# Patient Record
Sex: MALE | Race: OTHER | NOT HISPANIC OR LATINO | Employment: FULL TIME | URBAN - METROPOLITAN AREA
[De-identification: names, ages, dates, MRNs, and addresses within clinical notes are randomized per-mention and may not be internally consistent; named-entity substitution may affect disease eponyms.]

---

## 2019-07-28 RX ORDER — CITALOPRAM 20 MG/1
TABLET ORAL
Qty: 30 TABLET | Refills: 5 | OUTPATIENT
Start: 2019-07-28

## 2019-08-08 RX ORDER — CITALOPRAM 20 MG/1
10 TABLET ORAL DAILY
Refills: 5 | COMMUNITY
Start: 2019-06-29 | End: 2021-06-01 | Stop reason: SDUPTHER

## 2019-08-09 ENCOUNTER — OFFICE VISIT (OUTPATIENT)
Dept: FAMILY MEDICINE CLINIC | Facility: CLINIC | Age: 59
End: 2019-08-09
Payer: COMMERCIAL

## 2019-08-09 VITALS
RESPIRATION RATE: 18 BRPM | HEART RATE: 61 BPM | HEIGHT: 72 IN | WEIGHT: 228.2 LBS | TEMPERATURE: 97.4 F | OXYGEN SATURATION: 99 % | DIASTOLIC BLOOD PRESSURE: 76 MMHG | BODY MASS INDEX: 30.91 KG/M2 | SYSTOLIC BLOOD PRESSURE: 142 MMHG

## 2019-08-09 DIAGNOSIS — E78.5 HYPERLIPIDEMIA, UNSPECIFIED HYPERLIPIDEMIA TYPE: ICD-10-CM

## 2019-08-09 DIAGNOSIS — F41.9 ANXIETY: Primary | ICD-10-CM

## 2019-08-09 DIAGNOSIS — L40.9 PSORIASIS: ICD-10-CM

## 2019-08-09 DIAGNOSIS — R35.1 NOCTURIA: ICD-10-CM

## 2019-08-09 PROCEDURE — 99203 OFFICE O/P NEW LOW 30 MIN: CPT | Performed by: FAMILY MEDICINE

## 2019-08-09 PROCEDURE — 3008F BODY MASS INDEX DOCD: CPT | Performed by: FAMILY MEDICINE

## 2019-08-09 PROCEDURE — 1036F TOBACCO NON-USER: CPT | Performed by: FAMILY MEDICINE

## 2019-08-09 RX ORDER — CITALOPRAM 20 MG/1
20 TABLET ORAL DAILY
Qty: 90 TABLET | Refills: 3 | Status: SHIPPED | OUTPATIENT
Start: 2019-08-09 | End: 2020-08-18 | Stop reason: SDUPTHER

## 2019-08-09 NOTE — PROGRESS NOTES
FAMILY PRACTICE OFFICE VISIT       NAME: Sybil Geiger  AGE: 63 y o  SEX: male       : 1960        MRN: [id number]    DATE: 2019  TIME: 1:11 PM    Assessment and Plan     Problem List Items Addressed This Visit        Musculoskeletal and Integument    Psoriasis     Psoriasis  Patient given a prescription for Ultravate cream to apply twice daily to affected area for maximum of 2 weeks at a time  Patient will call if he continues with symptoms despite use of medication         Relevant Medications    halobetasol (ULTRAVATE) 0 05 % cream    Other Relevant Orders    TSH, 3rd generation    Comprehensive metabolic panel    CBC       Other    Anxiety - Primary     Anxiety  Patient was given a refill on his medication which she feels works well for his generalized anxiety  Relevant Medications    citalopram (CeleXA) 20 mg tablet      Other Visit Diagnoses     Nocturia        Relevant Orders    PSA, Total Screen    Hyperlipidemia, unspecified hyperlipidemia type        Relevant Orders    Lipid panel              Chief Complaint     Chief Complaint   Patient presents with    new patient       History of Present Illness     This is the 1st office visit for patient who used to see Tom Remy  He he would only see Dr  Every few years and have rare periodic blood work  He has been on Celexa for the past 20 years with good success in treating his anxiety  We discussed screening colonoscopy and blood work that is recommended in his age group  Patient also has a long history of psoriasis that was diagnosed by a Dr Jose Hernández  He had been using an over-the-counter preparation that was not effective but recommended by dermatologist   Patient requested alternative treatment for his intermittent flare-ups of psoriasis which is primarily on his hands only      Review of Systems   Review of Systems   Constitutional: Negative  HENT: Negative  Respiratory: Negative  Cardiovascular: Negative  Gastrointestinal: Negative  Genitourinary: Negative  Skin:        As per HPI   Neurological: Negative  Psychiatric/Behavioral:        As per HPI       Active Problem List     Patient Active Problem List   Diagnosis    Anxiety    Psoriasis       Past Medical History:  No past medical history on file  Past Surgical History:  No past surgical history on file  Family History:  No family history on file      Social History:  Social History     Socioeconomic History    Marital status: Unknown     Spouse name: Not on file    Number of children: Not on file    Years of education: Not on file    Highest education level: Not on file   Occupational History    Not on file   Social Needs    Financial resource strain: Not on file    Food insecurity:     Worry: Not on file     Inability: Not on file    Transportation needs:     Medical: Not on file     Non-medical: Not on file   Tobacco Use    Smoking status: Never Smoker    Smokeless tobacco: Never Used   Substance and Sexual Activity    Alcohol use: Yes     Frequency: Monthly or less     Drinks per session: 1 or 2    Drug use: Not on file    Sexual activity: Not on file   Lifestyle    Physical activity:     Days per week: Not on file     Minutes per session: Not on file    Stress: Not on file   Relationships    Social connections:     Talks on phone: Not on file     Gets together: Not on file     Attends [employer]ars[employer] service: Not on file     Active member of club or organization: Not on file     Attends meetings of clubs or organizations: Not on file     Relationship status: Not on file    Intimate partner violence:     Fear of current or ex partner: Not on file     Emotionally abused: Not on file     Physically abused: Not on file     Forced sexual activity: Not on file   Other Topics Concern    Not on file   Social History Narrative    Not on file       Objective     Vitals:    08/09/19 1147   BP: 142/76   Pulse: 61   Resp: 18   Temp: (!) 97 4 °F (36 3 °C)   SpO2: 99%     Wt Readings from Last 3 Encounters:   08/09/19 104 kg (228 lb 3 2 oz)       Physical Exam   Constitutional: He is oriented to person, place, and time  No distress  HENT:   Right Ear: External ear normal    Left Ear: External ear normal    Mouth/Throat: Oropharynx is clear and moist  No oropharyngeal exudate  Tympanic membranes within normal limits bilaterally   Cardiovascular: Normal heart sounds  Regular rate and rhythm with no murmurs   Pulmonary/Chest: Breath sounds normal    Lungs are clear to auscultation without wheezes,rales, or rhonchi   Musculoskeletal: He exhibits no edema  Lymphadenopathy:     He has no cervical adenopathy  Neurological: He is alert and oriented to person, place, and time  No cranial nerve deficit  Skin:   Patient with dry cracked skin along fingers of bilateral hands  No sinus infection  Psychiatric: He has a normal mood and affect  His behavior is normal  Judgment and thought content normal        Pertinent Laboratory/Diagnostic Studies:  No results found for: GLUCOSE, BUN, CREATININE, CALCIUM, NA, K, CO2, CL  No results found for: ALT, AST, GGT, ALKPHOS, BILITOT    No results found for: WBC, HGB, HCT, MCV, PLT    No results found for: TSH    No results found for: CHOL  No results found for: TRIG  No results found for: HDL  No results found for: LDLCALC  No results found for: HGBA1C    No results found for this or any previous visit      Orders Placed This Encounter   Procedures    Lipid panel    PSA, Total Screen    TSH, 3rd generation    Comprehensive metabolic panel    CBC       ALLERGIES:  No Known Allergies    Current Medications     Current Outpatient Medications   Medication Sig Dispense Refill    citalopram (CeleXA) 20 mg tablet Take 20 mg by mouth daily  5    citalopram (CeleXA) 20 mg tablet Take 1 tablet (20 mg total) by mouth daily 90 tablet 3    halobetasol (ULTRAVATE) 0 05 % cream Apply topically 2 (two) times a day 50 g 3 No current facility-administered medications for this visit  [facility] [place]     Health Maintenance   Topic Date Due    Hepatitis C Screening  1960    CRC Screening: Colonoscopy  1960    BMI: Followup Plan  08/16/1979    DTaP,Tdap,and Td Vaccines (1 - Tdap) 08/16/1982    INFLUENZA VACCINE  07/01/2019    Depression Screening PHQ  08/09/2020    BMI: Adult  08/09/2020    Pneumococcal Vaccine: 65+ Years (1 of 2 - PCV13) 08/16/2026    Pneumococcal Vaccine: Pediatrics (0 to 5 Years) and At-Risk Patients (6 to 59 Years)  Aged Out    HEPATITIS B VACCINES  Aged Out       There is no immunization history on file for this patient      Brisa Coleman MD

## 2019-08-09 NOTE — ASSESSMENT & PLAN NOTE
Anxiety  Patient was given a refill on his medication which she feels works well for his generalized anxiety

## 2019-08-09 NOTE — ASSESSMENT & PLAN NOTE
Psoriasis  Patient given a prescription for Ultravate cream to apply twice daily to affected area for maximum of 2 weeks at a time    Patient will call if he continues with symptoms despite use of medication

## 2019-09-12 ENCOUNTER — TELEPHONE (OUTPATIENT)
Dept: FAMILY MEDICINE CLINIC | Facility: CLINIC | Age: 59
End: 2019-09-12

## 2019-09-12 DIAGNOSIS — Z12.11 COLON CANCER SCREENING: Primary | ICD-10-CM

## 2019-09-12 NOTE — TELEPHONE ENCOUNTER
Patient is interested in the Cologard if you would put order in the system and have [facility]  Advise patient at [phone] when done

## 2019-11-05 ENCOUNTER — APPOINTMENT (OUTPATIENT)
Dept: LAB | Facility: CLINIC | Age: 59
End: 2019-11-05
Payer: COMMERCIAL

## 2019-11-05 DIAGNOSIS — E78.5 HYPERLIPIDEMIA, UNSPECIFIED HYPERLIPIDEMIA TYPE: ICD-10-CM

## 2019-11-05 DIAGNOSIS — L40.9 PSORIASIS: ICD-10-CM

## 2019-11-05 DIAGNOSIS — R35.1 NOCTURIA: ICD-10-CM

## 2019-11-05 LAB
ALBUMIN SERPL BCP-MCNC: 4.2 G/DL (ref 3.5–5)
ALP SERPL-CCNC: 56 U/L (ref 46–116)
ALT SERPL W P-5'-P-CCNC: 36 U/L (ref 12–78)
ANION GAP SERPL CALCULATED.3IONS-SCNC: 7 MMOL/L (ref 4–13)
AST SERPL W P-5'-P-CCNC: 21 U/L (ref 5–45)
BILIRUB SERPL-MCNC: 0.88 MG/DL (ref 0.2–1)
BUN SERPL-MCNC: 18 MG/DL (ref 5–25)
CALCIUM SERPL-MCNC: 9.2 MG/DL (ref 8.3–10.1)
CHLORIDE SERPL-SCNC: 106 MMOL/L (ref 100–108)
CHOLEST SERPL-MCNC: 190 MG/DL (ref 50–200)
CO2 SERPL-SCNC: 27 MMOL/L (ref 21–32)
CREAT SERPL-MCNC: 0.87 MG/DL (ref 0.6–1.3)
ERYTHROCYTE [DISTWIDTH] IN BLOOD BY AUTOMATED COUNT: 12.4 % (ref 11.6–15.1)
GFR SERPL CREATININE-BSD FRML MDRD: 95 ML/MIN/1.73SQ M
GLUCOSE P FAST SERPL-MCNC: 103 MG/DL (ref 65–99)
HCT VFR BLD AUTO: 44.3 % (ref 36.5–49.3)
HDLC SERPL-MCNC: 51 MG/DL
HGB BLD-MCNC: 14.8 G/DL (ref 12–17)
LDLC SERPL CALC-MCNC: 124 MG/DL (ref 0–100)
MCH RBC QN AUTO: 30.3 PG (ref 26.8–34.3)
MCHC RBC AUTO-ENTMCNC: 33.4 G/DL (ref 31.4–37.4)
MCV RBC AUTO: 91 FL (ref 82–98)
NONHDLC SERPL-MCNC: 139 MG/DL
PLATELET # BLD AUTO: 232 THOUSANDS/UL (ref 149–390)
PMV BLD AUTO: 10.4 FL (ref 8.9–12.7)
POTASSIUM SERPL-SCNC: 3.9 MMOL/L (ref 3.5–5.3)
PROT SERPL-MCNC: 7.2 G/DL (ref 6.4–8.2)
PSA SERPL-MCNC: 0.4 NG/ML (ref 0–4)
RBC # BLD AUTO: 4.88 MILLION/UL (ref 3.88–5.62)
SODIUM SERPL-SCNC: 140 MMOL/L (ref 136–145)
TRIGL SERPL-MCNC: 77 MG/DL
TSH SERPL DL<=0.05 MIU/L-ACNC: 1.06 UIU/ML (ref 0.36–3.74)
WBC # BLD AUTO: 6.15 THOUSAND/UL (ref 4.31–10.16)

## 2019-11-05 PROCEDURE — 85027 COMPLETE CBC AUTOMATED: CPT

## 2019-11-05 PROCEDURE — 80061 LIPID PANEL: CPT

## 2019-11-05 PROCEDURE — G0103 PSA SCREENING: HCPCS

## 2019-11-05 PROCEDURE — 84443 ASSAY THYROID STIM HORMONE: CPT

## 2019-11-05 PROCEDURE — 36415 COLL VENOUS BLD VENIPUNCTURE: CPT

## 2019-11-05 PROCEDURE — 80053 COMPREHEN METABOLIC PANEL: CPT

## 2019-11-06 ENCOUNTER — TELEPHONE (OUTPATIENT)
Dept: FAMILY MEDICINE CLINIC | Facility: CLINIC | Age: 59
End: 2019-11-06

## 2019-11-06 NOTE — TELEPHONE ENCOUNTER
----- Message from Emiliano Rosario MD sent at 40/1/8375  8:01 AM EST -----  All recent blood work stable for patient

## 2020-08-18 ENCOUNTER — OFFICE VISIT (OUTPATIENT)
Dept: FAMILY MEDICINE CLINIC | Facility: CLINIC | Age: 60
End: 2020-08-18
Payer: COMMERCIAL

## 2020-08-18 VITALS
RESPIRATION RATE: 15 BRPM | SYSTOLIC BLOOD PRESSURE: 120 MMHG | HEIGHT: 73 IN | BODY MASS INDEX: 30.38 KG/M2 | OXYGEN SATURATION: 97 % | DIASTOLIC BLOOD PRESSURE: 70 MMHG | HEART RATE: 71 BPM | WEIGHT: 229.2 LBS | TEMPERATURE: 97.6 F

## 2020-08-18 DIAGNOSIS — L40.9 PSORIASIS: ICD-10-CM

## 2020-08-18 DIAGNOSIS — T14.8XXA MUSCLE STRAIN: Primary | ICD-10-CM

## 2020-08-18 PROCEDURE — 3008F BODY MASS INDEX DOCD: CPT | Performed by: FAMILY MEDICINE

## 2020-08-18 PROCEDURE — 3725F SCREEN DEPRESSION PERFORMED: CPT | Performed by: FAMILY MEDICINE

## 2020-08-18 PROCEDURE — 99213 OFFICE O/P EST LOW 20 MIN: CPT | Performed by: FAMILY MEDICINE

## 2020-08-18 PROCEDURE — 1036F TOBACCO NON-USER: CPT | Performed by: FAMILY MEDICINE

## 2020-08-18 RX ORDER — PREDNISONE 20 MG/1
TABLET ORAL
Qty: 11 TABLET | Refills: 0 | Status: SHIPPED | OUTPATIENT
Start: 2020-08-18

## 2020-08-18 RX ORDER — LOTEPREDNOL ETABONATE 5 MG/G
OINTMENT OPHTHALMIC
COMMUNITY
Start: 2020-07-08

## 2020-08-18 RX ORDER — AZELAIC ACID 0.15 G/G
GEL TOPICAL
COMMUNITY
Start: 2020-08-12

## 2020-08-18 RX ORDER — MINOCYCLINE HYDROCHLORIDE 100 MG/1
CAPSULE ORAL
COMMUNITY
Start: 2020-08-17

## 2020-08-18 NOTE — ASSESSMENT & PLAN NOTE
Muscle strain  I suspect patient has left gastrocnemius strain  He was given prescription for prednisone tapering dose consisting of 40 mg x 3 days, 20 mg x 3 days, 10 mg x 4 days  He may also applies ice to the affected area for 10 minutes on a daily basis  Patient will call if symptoms persist without change after medication completed

## 2020-08-18 NOTE — PROGRESS NOTES
FAMILY PRACTICE OFFICE VISIT       NAME: Huong Frye  AGE: 62 y o  SEX: male       : 1960        MRN: [id number]    DATE: 2020  TIME: 1:44 PM    Assessment and Plan     Problem List Items Addressed This Visit        Musculoskeletal and Integument    Psoriasis    Relevant Medications    Azelaic Acid 15 % cream    predniSONE 20 mg tablet    halobetasol (ULTRAVATE) 0 05 % cream    Muscle strain - Primary     Muscle strain  I suspect patient has left gastrocnemius strain  He was given prescription for prednisone tapering dose consisting of 40 mg x 3 days, 20 mg x 3 days, 10 mg x 4 days  He may also applies ice to the affected area for 10 minutes on a daily basis  Patient will call if symptoms persist without change after medication completed  Chief Complaint     Chief Complaint   Patient presents with    Annual Exam    calf pain     left x 1 week        History of Present Illness     Patient states 5-10 years ago he had back pain for suspected herniated disc  He had physical therapy and medications and chiropractic treatments  At that time he did have lumbar spine discomfort with left calf pain  Currently he states he has an ache in his left calf the area that is only symptomatic with extended periods of standing or pushing off on his toes  He has no discomfort while sitting  He denies any chest pain or shortness of breath  He states approximately 10 days ago he was very busy in climbing in and out of a truck but denies any discomfort due during that day at work  Review of Systems   Review of Systems   Constitutional: Negative  Respiratory: Negative  Cardiovascular: Negative  Musculoskeletal: Positive for myalgias  Active Problem List     Patient Active Problem List   Diagnosis    Anxiety    Psoriasis    Muscle strain       Past Medical History:  No past medical history on file  Past Surgical History:  No past surgical history on file      Family History:  No family history on file  Social History:  Social History     Socioeconomic History    Marital status: Unknown     Spouse name: Not on file    Number of children: Not on file    Years of education: Not on file    Highest education level: Not on file   Occupational History    Not on file   Social Needs    Financial resource strain: Not on file    Food insecurity     Worry: Not on file     Inability: Not on file    Transportation needs     Medical: Not on file     Non-medical: Not on file   Tobacco Use    Smoking status: Never Smoker    Smokeless tobacco: Never Used   Substance and Sexual Activity    Alcohol use: Yes     Frequency: Monthly or less     Drinks per session: 1 or 2     Comment: occasional    Drug use: Never    Sexual activity: Not on file   Lifestyle    Physical activity     Days per week: Not on file     Minutes per session: Not on file    Stress: Not on file   Relationships    Social connections     Talks on phone: Not on file     Gets together: Not on file     Attends [employer] service: Not on file     Active member of club or organization: Not on file     Attends meetings of clubs or organizations: Not on file     Relationship status: Not on file    Intimate partner violence     Fear of current or ex partner: Not on file     Emotionally abused: Not on file     Physically abused: Not on file     Forced sexual activity: Not on file   Other Topics Concern    Not on file   Social History Narrative    Not on file       Objective     Vitals:    08/18/20 1307   BP: 120/70   Pulse: 71   Resp: 15   Temp: 97 6 °F (36 4 °C)   SpO2: 97%     Wt Readings from Last 3 Encounters:   08/18/20 104 kg (229 lb 3 2 oz)   08/09/19 104 kg (228 lb 3 2 oz)       Physical Exam  Constitutional:       Appearance: Normal appearance  Musculoskeletal:         General: No swelling  Right lower leg: No edema  Left lower leg: No edema        Comments: Patient's left calf with negative Homans sign   Negative ecchymosis  Muscle strength 5/5  Normal range of motion  Negative straight leg raising  Increased tenderness with standing on tiptoes  Neurological:      Mental Status: He is alert           Pertinent Laboratory/Diagnostic Studies:  Lab Results   Component Value Date    BUN 18 11/05/2019    CREATININE 0 87 11/05/2019    CALCIUM 9 2 11/05/2019    K 3 9 11/05/2019    CO2 27 11/05/2019     11/05/2019     Lab Results   Component Value Date    ALT 36 11/05/2019    AST 21 11/05/2019    ALKPHOS 56 11/05/2019       Lab Results   Component Value Date    WBC 6 15 11/05/2019    HGB 14 8 11/05/2019    HCT 44 3 11/05/2019    MCV 91 11/05/2019     11/05/2019       No results found for: TSH    No results found for: CHOL  Lab Results   Component Value Date    TRIG 77 11/05/2019     Lab Results   Component Value Date    HDL 51 11/05/2019     Lab Results   Component Value Date    LDLCALC 124 (H) 11/05/2019     No results found for: HGBA1C    Results for orders placed or performed in visit on 11/05/19   Lipid panel   Result Value Ref Range    Cholesterol 190 50 - 200 mg/dL    Triglycerides 77 <=150 mg/dL    HDL, Direct 51 >=40 mg/dL    LDL Calculated 124 (H) 0 - 100 mg/dL    Non-HDL-Chol (CHOL-HDL) 139 mg/dl   PSA, Total Screen   Result Value Ref Range    PSA 0 4 0 0 - 4 0 ng/mL   TSH, 3rd generation   Result Value Ref Range    TSH 3RD GENERATON 1 060 0 358 - 3 740 uIU/mL   Comprehensive metabolic panel   Result Value Ref Range    Sodium 140 136 - 145 mmol/L    Potassium 3 9 3 5 - 5 3 mmol/L    Chloride 106 100 - 108 mmol/L    CO2 27 21 - 32 mmol/L    ANION GAP 7 4 - 13 mmol/L    BUN 18 5 - 25 mg/dL    Creatinine 0 87 0 60 - 1 30 mg/dL    Glucose, Fasting 103 (H) 65 - 99 mg/dL    Calcium 9 2 8 3 - 10 1 mg/dL    AST 21 5 - 45 U/L    ALT 36 12 - 78 U/L    Alkaline Phosphatase 56 46 - 116 U/L    Total Protein 7 2 6 4 - 8 2 g/dL    Albumin 4 2 3 5 - 5 0 g/dL    Total Bilirubin 0 88 0 20 - 1 00 mg/dL eGFR 95 ml/min/1 73sq m   CBC   Result Value Ref Range    WBC 6 15 4 31 - 10 16 Thousand/uL    RBC 4 88 3 88 - 5 62 Million/uL    Hemoglobin 14 8 12 0 - 17 0 g/dL    Hematocrit 44 3 36 5 - 49 3 %    MCV 91 82 - 98 fL    MCH 30 3 26 8 - 34 3 pg    MCHC 33 4 31 4 - 37 4 g/dL    RDW 12 4 11 6 - 15 1 %    Platelets 561 767 - 140 Thousands/uL    MPV 10 4 8 9 - 12 7 fL       No orders of the defined types were placed in this encounter  ALLERGIES:  No Known Allergies    Current Medications     Current Outpatient Medications   Medication Sig Dispense Refill    Azelaic Acid 15 % cream       citalopram (CeleXA) 20 mg tablet Take 10 mg by mouth daily   5    Lotemax 0 5 % OINT INSTILL 1/2 [facility] INTO THE RIGHT EYE      minocycline (MINOCIN) 100 mg capsule       halobetasol (ULTRAVATE) 0 05 % cream Apply topically 2 (two) times a day 50 g 3    predniSONE 20 mg tablet 2 tabs X 3 days, 1 tab X 3 days, 1/2 tab X 4 days 11 tablet 0     No current facility-administered medications for this visit  Health Maintenance     Health Maintenance   Topic Date Due    Hepatitis C Screening  1960    HIV Screening  08/16/1976    BMI: Followup Plan  08/16/1979    Annual Physical  08/16/1979    DTaP,Tdap,and Td Vaccines (1 - Tdap) 08/16/1982    Influenza Vaccine  07/01/2020    Depression Screening PHQ  08/18/2021    BMI: Adult  08/18/2021    Colorectal Cancer Screening  11/09/2022    Pneumococcal Vaccine: Pediatrics (0 to 5 Years) and At-Risk Patients (6 to 59 Years)  Aged Out    HIB Vaccine  Aged Out    Hepatitis B Vaccine  Aged Out    IPV Vaccine  Aged Out    Hepatitis A Vaccine  Aged Out    Meningococcal ACWY Vaccine  Aged Out    HPV Vaccine  Aged Out       There is no immunization history on file for this patient      Raeann Guzman MD

## 2020-08-20 ENCOUNTER — EVALUATION (OUTPATIENT)
Dept: PHYSICAL THERAPY | Facility: REHABILITATION | Age: 60
End: 2020-08-20
Payer: COMMERCIAL

## 2020-08-20 ENCOUNTER — TELEPHONE (OUTPATIENT)
Dept: FAMILY MEDICINE CLINIC | Facility: CLINIC | Age: 60
End: 2020-08-20

## 2020-08-20 DIAGNOSIS — M54.16 ACUTE LEFT LUMBAR RADICULOPATHY: Primary | ICD-10-CM

## 2020-08-20 DIAGNOSIS — T14.8XXA MUSCLE STRAIN: ICD-10-CM

## 2020-08-20 DIAGNOSIS — T14.8XXA MUSCLE STRAIN: Primary | ICD-10-CM

## 2020-08-20 PROCEDURE — 97162 PT EVAL MOD COMPLEX 30 MIN: CPT | Performed by: PHYSICAL THERAPIST

## 2020-08-20 PROCEDURE — 97110 THERAPEUTIC EXERCISES: CPT | Performed by: PHYSICAL THERAPIST

## 2020-08-20 NOTE — TELEPHONE ENCOUNTER
If patient is in that much discomfort he would need to be seen in the emergency room to rule out the possibility of a blood clot    I recommend that he go to [facility] or [facility] for evaluation

## 2020-08-20 NOTE — TELEPHONE ENCOUNTER
Left detailed message for patient on his voice mail to be seen at either the [facility] or [address] [place] [address] of Queenie Phalen  I also asked him to please call us back and update us on his symptoms and how he is doing

## 2020-08-20 NOTE — TELEPHONE ENCOUNTER
He has only been on med for a couple days so too early to see if it would help  I would recommend starting physical therapy as we discussed  He can make appt with [facility] in [place] at [phone]  I can write note for work but cannot have it open ended ( until pain gets better)   We could try 2 weeks off and if persists he may need f/u ov
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
## 2020-08-20 NOTE — TELEPHONE ENCOUNTER
Patient called stating he is still having an extreme amount of pain, prednisone has not helped at all, he can barely walk, he is requesting a letter to be off work until pain is maintained  He would also like another medication prescribed  Thank you!

## 2020-08-20 NOTE — TELEPHONE ENCOUNTER
Patient was evaluated by PT today, they believe it is nerve pain, he is going to  continue to work with them and see if it gets better

## 2020-08-20 NOTE — TELEPHONE ENCOUNTER
Will write letter for patent for the two weeks and gave him information for PT  Patient statess he does not thing that PT will help and he is in so much pain due to not being able to even walk in to the kitchen  He had to be sent home from work today since he could not stand and was in way too much pain  Would you be willing to prescribe a pain medication?

## 2020-08-24 NOTE — PROGRESS NOTES
Daily Note     Today's date: 2020  Patient name: Juan Potter  : 1960  MRN: [id number]  Referring provider: Marquis Nomi MD  Dx:   Encounter Diagnosis     ICD-10-CM    1  Muscle strain  T14  8XXA    2  Acute left lumbar radiculopathy  M54 16        Start Time: 0940  Stop Time: 1020  Total time in clinic (min): 40 minutes    Subjective: Pt states he is feeling a lot better  No pain today, just some numbness in the L foot  Objective: See treatment diary below  Lumbar flex and ext AROM: full and pain free  Reflexes   Left   Patellar (L4): brisk (3+)  Achilles (S1): brisk (3+)     Right   Patellar (L4): normal (2+)  Achilles (S1): normal (2+)    Myotomes testing  DF = 5/5 RLE  DF = 3-/5 LLE  Great toe ext RLE = 5/5  Great toe ext LLE = 3-/5    SLR and slump test  R: neg  L: neg    Light touch BLE intact     Assessment: Pt presents with significant improvement in pain and sx's with only reports of L toe tingling and numbness  He continues to present with LLE hyper reflexia and DF/great toe ext weakness  Pt responded well to core strengthening exercises  Updated HEP - prone press ups 2x/day and TrA iso  Plan: Continue per plan of care  Reassess LE MMT and reflexes next visit  Progress core stabilization and return to workout/work       Precautions: standard    FOTO   = 45/71    Manuals            LLE LAD DS            BLE LAD DS                         Assess  5' DS           Neuro Re-Ed             TrA with paloff press  5"2x10 each purple           Supine TrA with dying bug LE only  2x10 each           Bosu bridges  5"x20           TrA iso  Pt edu                                                  Ther Ex             Double knee to chest stretch 5"x20            TrA iso 5"x15            Prone on elbows  90" no change HEP          Prone press up  5"x15 sx's dec           Slant board  2'                                                  Ther Activity             Pt edu 5' Gait Training                                       Modalities

## 2020-08-25 ENCOUNTER — OFFICE VISIT (OUTPATIENT)
Dept: PHYSICAL THERAPY | Facility: REHABILITATION | Age: 60
End: 2020-08-25
Payer: COMMERCIAL

## 2020-08-25 DIAGNOSIS — T14.8XXA MUSCLE STRAIN: Primary | ICD-10-CM

## 2020-08-25 DIAGNOSIS — M54.16 ACUTE LEFT LUMBAR RADICULOPATHY: ICD-10-CM

## 2020-08-25 PROCEDURE — 97140 MANUAL THERAPY 1/> REGIONS: CPT | Performed by: PHYSICAL THERAPIST

## 2020-08-25 PROCEDURE — 97112 NEUROMUSCULAR REEDUCATION: CPT | Performed by: PHYSICAL THERAPIST

## 2020-08-25 PROCEDURE — 97110 THERAPEUTIC EXERCISES: CPT | Performed by: PHYSICAL THERAPIST

## 2020-08-27 ENCOUNTER — APPOINTMENT (OUTPATIENT)
Dept: PHYSICAL THERAPY | Facility: REHABILITATION | Age: 60
End: 2020-08-27
Payer: COMMERCIAL

## 2020-09-30 ENCOUNTER — TELEPHONE (OUTPATIENT)
Dept: FAMILY MEDICINE CLINIC | Facility: CLINIC | Age: 60
End: 2020-09-30

## 2020-09-30 DIAGNOSIS — T14.8XXA MUSCLE STRAIN: Primary | ICD-10-CM

## 2020-09-30 RX ORDER — PREDNISONE 20 MG/1
TABLET ORAL
Qty: 10 TABLET | Refills: 0 | Status: SHIPPED | OUTPATIENT
Start: 2020-09-30 | End: 2021-09-07 | Stop reason: SDUPTHER

## 2020-09-30 NOTE — TELEPHONE ENCOUNTER
Patient was seen in August for a muscle strain and he has strained the same muscle again, would like to know if the doctor would be willing to prescribe prednisone for 7 days again without an appt because he is leaving on vacation and would like to nip the pain before it becomes as bad as it was last time  Please advise

## 2021-06-01 DIAGNOSIS — F41.9 ANXIETY: Primary | ICD-10-CM

## 2021-06-01 RX ORDER — CITALOPRAM 20 MG/1
10 TABLET ORAL DAILY
Qty: 30 TABLET | Refills: 0 | Status: SHIPPED | OUTPATIENT
Start: 2021-06-01 | End: 2021-07-02

## 2021-06-01 NOTE — TELEPHONE ENCOUNTER
Pt pharmacy requesting refill    Requested Prescriptions     Pending Prescriptions Disp Refills    citalopram (CeleXA) 20 mg tablet  5     Sig: Take 0 5 tablets (10 mg total) by mouth daily

## 2021-07-31 DIAGNOSIS — F41.9 ANXIETY: ICD-10-CM

## 2021-08-03 RX ORDER — CITALOPRAM 20 MG/1
TABLET ORAL
Qty: 15 TABLET | Refills: 0 | Status: SHIPPED | OUTPATIENT
Start: 2021-08-03 | End: 2021-08-30

## 2021-08-28 DIAGNOSIS — F41.9 ANXIETY: ICD-10-CM

## 2021-08-30 RX ORDER — CITALOPRAM 20 MG/1
TABLET ORAL
Qty: 15 TABLET | Refills: 0 | Status: SHIPPED | OUTPATIENT
Start: 2021-08-30 | End: 2021-09-30

## 2021-08-30 NOTE — TELEPHONE ENCOUNTER
I will provide 30 day supply for patient however he needs office visit prior to authorizing refills on this medication

## 2021-09-07 ENCOUNTER — OFFICE VISIT (OUTPATIENT)
Dept: FAMILY MEDICINE CLINIC | Facility: CLINIC | Age: 61
End: 2021-09-07
Payer: COMMERCIAL

## 2021-09-07 VITALS
BODY MASS INDEX: 31.14 KG/M2 | SYSTOLIC BLOOD PRESSURE: 124 MMHG | DIASTOLIC BLOOD PRESSURE: 68 MMHG | TEMPERATURE: 97.8 F | WEIGHT: 217.5 LBS | OXYGEN SATURATION: 97 % | RESPIRATION RATE: 17 BRPM | HEART RATE: 58 BPM | HEIGHT: 70 IN

## 2021-09-07 DIAGNOSIS — R20.2 PARESTHESIA: Primary | ICD-10-CM

## 2021-09-07 DIAGNOSIS — F41.9 ANXIETY: ICD-10-CM

## 2021-09-07 DIAGNOSIS — T14.8XXA MUSCLE STRAIN: ICD-10-CM

## 2021-09-07 PROCEDURE — 3008F BODY MASS INDEX DOCD: CPT | Performed by: FAMILY MEDICINE

## 2021-09-07 PROCEDURE — 1036F TOBACCO NON-USER: CPT | Performed by: FAMILY MEDICINE

## 2021-09-07 PROCEDURE — 99214 OFFICE O/P EST MOD 30 MIN: CPT | Performed by: FAMILY MEDICINE

## 2021-09-07 PROCEDURE — 3725F SCREEN DEPRESSION PERFORMED: CPT | Performed by: FAMILY MEDICINE

## 2021-09-07 RX ORDER — PREDNISONE 20 MG/1
TABLET ORAL
Qty: 10 TABLET | Refills: 0 | Status: SHIPPED | OUTPATIENT
Start: 2021-09-07

## 2021-09-07 NOTE — ASSESSMENT & PLAN NOTE
Anxiety  Patient with symptoms related to anxiety and OCD  The symptoms are exacerbated by his work duties  Patient requested a leave of absence to see if the symptoms improve as well as his chronic intermittent leg pains  He was given prescription to be excused from work until follow-up appointment in mid October 2021

## 2021-09-07 NOTE — ASSESSMENT & PLAN NOTE
Paresthesia  Patient was given a refill on prednisone tapering dose to take as directed  If symptoms persist without improvement he was given prescription to obtain EMG testing of lower extremities for further evaluation

## 2021-09-07 NOTE — PROGRESS NOTES
FAMILY PRACTICE OFFICE VISIT       NAME: Raiza Bajwa  AGE: 62 y o  SEX: male       : 1960        MRN: [id number]    DATE: 2021  TIME: 1:37 PM    Assessment and Plan     Problem List Items Addressed This Visit        Musculoskeletal and Integument    Muscle strain    Relevant Medications    predniSONE 20 mg tablet       Other    Anxiety      Anxiety  Patient with symptoms related to anxiety and OCD  The symptoms are exacerbated by his work duties  Patient requested a leave of absence to see if the symptoms improve as well as his chronic intermittent leg pains  He was given prescription to be excused from work until follow-up appointment in mid 2021  Paresthesia - Primary       Paresthesia  Patient was given a refill on prednisone tapering dose to take as directed  If symptoms persist without improvement he was given prescription to obtain EMG testing of lower extremities for further evaluation  Relevant Orders    EMG 2 limb lower extremity          BMI Counseling: Body mass index is 31 14 kg/m²  The BMI is above normal  Nutrition recommendations include decreasing portion sizes and moderation in carbohydrate intake  Exercise recommendations include exercising 3-5 times per week  No pharmacotherapy was ordered  Patient referred to PCP due to patient being overweight  Chief Complaint     Chief Complaint   Patient presents with    Anxiety    Leg Pain    Well Check       History of Present Illness       Patient in the office with complaints of chronic intermittent bilateral lower extremity discomfort  Patient has a history arm of herniated disc in his lumbar spine  Last year he did take some prednisone which seemed to help his situation however due to the nature was 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  He does have some numbness of his toes on his left foot        Patient also describes chronic his symptoms of anxiety and OCD during his work week  Symptoms are better when he was off for work on the weekends  He feels he is under constant pressure at work and they have been short staffed  Other employees who had been hired ended up with medical ailments that prevented them from assisting him in performing job duties  Anxiety        Leg Pain         Review of Systems   Review of Systems   Constitutional: Negative  HENT: Negative  Respiratory: Negative  Cardiovascular: Negative  Gastrointestinal: Negative  Musculoskeletal:        As per HPI   Neurological:        As per HPI   Psychiatric/Behavioral:        As per HPI       Active Problem List     Patient Active Problem List   Diagnosis    Anxiety    Psoriasis    Muscle strain    Paresthesia       Past Medical History:  History reviewed  No pertinent past medical history  Past Surgical History:  Past Surgical History:   Procedure Laterality Date    KNEE SURGERY      SHOULDER SURGERY         Family History:  Family History   Problem Relation Age of Onset    Stroke Brother     No Known Problems Son     No Known Problems Daughter        Social History:  Social History     Socioeconomic History    Marital status: Unknown     Spouse name: Not on file    Number of children: Not on file    Years of education: Not on file    Highest education level: Not on file   Occupational History    Not on file   Tobacco Use    Smoking status: Never Smoker    Smokeless tobacco: Never Used   Vaping Use    Vaping Use: Never used   Substance and Sexual Activity    Alcohol use:  Yes     Alcohol/week: 1 0 standard drinks     Types: 1 Glasses of wine per week     Comment: occasional    Drug use: Never    Sexual activity: Not on file   Other Topics Concern    Not on file   Social History Narrative    Not on file     Social Determinants of Health     Financial Resource Strain:     Difficulty of Paying Living Expenses:    Food Insecurity:     Worried About Running Out of Food in the Last Year:     [address] in the Last Year:    Transportation Needs:     Lack of Transportation (Medical):  Lack of Transportation (Non-Medical):    Physical Activity:     Days of Exercise per Week:     Minutes of Exercise per Session:    Stress:     Feeling of Stress :    Social Connections:     Frequency of Communication with Friends and Family:     Frequency of Social Gatherings with Friends and Family:     Attends Judaism Services:     Active Member of Clubs or Organizations:     Attends Club or Organization Meetings:     Marital Status:    Intimate Partner Violence:     Fear of Current or Ex-Partner:     Emotionally Abused:     Physically Abused:     Sexually Abused:        Objective     Vitals:    09/07/21 1235   BP: 124/68   Pulse: 58   Resp: 17   Temp: 97 8 °F (36 6 °C)   SpO2: 97%     Wt Readings from Last 3 Encounters:   09/07/21 98 7 kg (217 lb 8 oz)   08/18/20 104 kg (229 lb 3 2 oz)   08/09/19 104 kg (228 lb 3 2 oz)       Physical Exam  Constitutional:       General: He is not in acute distress  Appearance: Normal appearance  He is not ill-appearing  Musculoskeletal:         General: No swelling, tenderness, deformity or signs of injury  Right lower leg: No edema  Left lower leg: No edema  Comments: Muscle strength 5/5 lower extremities  Negative straight leg raising bilaterally  Negative Homans sign  Neurological:      General: No focal deficit present  Mental Status: He is alert and oriented to person, place, and time  Mental status is at baseline  Cranial Nerves: No cranial nerve deficit  Psychiatric:         Mood and Affect: Mood normal          Behavior: Behavior normal          Thought Content:  Thought content normal          Judgment: Judgment normal          Pertinent Laboratory/Diagnostic Studies:  Lab Results   Component Value Date    BUN 18 11/05/2019    CREATININE 0 87 11/05/2019    CALCIUM 9 2 11/05/2019    K 3 9 11/05/2019    CO2 27 11/05/2019     11/05/2019     Lab Results   Component Value Date    ALT 36 11/05/2019    AST 21 11/05/2019    ALKPHOS 56 11/05/2019       Lab Results   Component Value Date    WBC 6 15 11/05/2019    HGB 14 8 11/05/2019    HCT 44 3 11/05/2019    MCV 91 11/05/2019     11/05/2019       No results found for: TSH    No results found for: CHOL  Lab Results   Component Value Date    TRIG 77 11/05/2019     Lab Results   Component Value Date    HDL 51 11/05/2019     Lab Results   Component Value Date    LDLCALC 124 (H) 11/05/2019     No results found for: HGBA1C    Results for orders placed or performed in visit on 11/05/19   Lipid panel   Result Value Ref Range    Cholesterol 190 50 - 200 mg/dL    Triglycerides 77 <=150 mg/dL    HDL, Direct 51 >=40 mg/dL    LDL Calculated 124 (H) 0 - 100 mg/dL    Non-HDL-Chol (CHOL-HDL) 139 mg/dl   PSA, Total Screen   Result Value Ref Range    PSA 0 4 0 0 - 4 0 ng/mL   TSH, 3rd generation   Result Value Ref Range    TSH 3RD GENERATON 1 060 0 358 - 3 740 uIU/mL   Comprehensive metabolic panel   Result Value Ref Range    Sodium 140 136 - 145 mmol/L    Potassium 3 9 3 5 - 5 3 mmol/L    Chloride 106 100 - 108 mmol/L    CO2 27 21 - 32 mmol/L    ANION GAP 7 4 - 13 mmol/L    BUN 18 5 - 25 mg/dL    Creatinine 0 87 0 60 - 1 30 mg/dL    Glucose, Fasting 103 (H) 65 - 99 mg/dL    Calcium 9 2 8 3 - 10 1 mg/dL    AST 21 5 - 45 U/L    ALT 36 12 - 78 U/L    Alkaline Phosphatase 56 46 - 116 U/L    Total Protein 7 2 6 4 - 8 2 g/dL    Albumin 4 2 3 5 - 5 0 g/dL    Total Bilirubin 0 88 0 20 - 1 00 mg/dL    eGFR 95 ml/min/1 73sq m   CBC   Result Value Ref Range    WBC 6 15 4 31 - 10 16 Thousand/uL    RBC 4 88 3 88 - 5 62 Million/uL    Hemoglobin 14 8 12 0 - 17 0 g/dL    Hematocrit 44 3 36 5 - 49 3 %    MCV 91 82 - 98 fL    MCH 30 3 26 8 - 34 3 pg    MCHC 33 4 31 4 - 37 4 g/dL    RDW 12 4 11 6 - 15 1 %    Platelets [phone] Thousands/uL    MPV 10 4 8 9 - 12 7 fL       Orders Placed This Encounter   Procedures    EMG 2 limb lower extremity       ALLERGIES:  No Known Allergies    Current Medications     Current Outpatient Medications   Medication Sig Dispense Refill    Azelaic Acid 15 % cream       citalopram (CeleXA) 20 mg tablet TAKE 1/2 TABLET BY MOUTH DAILY 15 tablet 0    halobetasol (ULTRAVATE) 0 05 % cream Apply topically 2 (two) times a day 50 g 3    Lotemax 0 5 % OINT INSTILL 1/2 [facility] INTO THE RIGHT EYE      minocycline (MINOCIN) 100 mg capsule       predniSONE 20 mg tablet 2 tabs X 3 days, 1 tab X 3 days, 1/2 tab X 4 days 11 tablet 0    predniSONE 20 mg tablet Take 2 tablets daily for 3 days, 1 tablet daily for 4 days 10 tablet 0     No current facility-administered medications for this visit           Health Maintenance     Health Maintenance   Topic Date Due    Hepatitis C Screening  Never done    HIV Screening  Never done    BMI: Followup Plan  Never done    Annual Physical  Never done    DTaP,Tdap,and Td Vaccines (1 - Tdap) Never done    Influenza Vaccine (1) 09/01/2021    Depression Screening PHQ  09/07/2022    BMI: Adult  09/07/2022    Colorectal Cancer Screening  11/09/2022    COVID-19 Vaccine  Completed    Pneumococcal Vaccine: Pediatrics (0 to 5 Years) and At-Risk Patients (6 to 59 Years)  Aged Out    HIB Vaccine  Aged Out    Hepatitis B Vaccine  Aged Out    IPV Vaccine  Aged Out    Hepatitis A Vaccine  Aged Out    Meningococcal ACWY Vaccine  Aged Out    HPV Vaccine  Aged [employer] History   Administered Date(s) Administered    Sars-cov-2 / Covid-19 vector-nr, [id number] vaccine Barbara Miles / Alisia Flores & Alisia Flores) 75/[birth year]/1906       Dominick Garza MD     I spent 25 minutes with this patient which greater than 50% spent counseling

## 2021-09-21 ENCOUNTER — TELEPHONE (OUTPATIENT)
Dept: FAMILY MEDICINE CLINIC | Facility: CLINIC | Age: 61
End: 2021-09-21

## 2021-09-21 NOTE — TELEPHONE ENCOUNTER
Patient called and stated that he needs a letter for work stating his condition and that he is taking medication  It needs to state he cannot work and that he has a follow up on 10/13/2021  He does have FMLA forms but needs this letter in the meantime  He will drop off forms when he picks  Up letter  Is this ok to write?   Please call to advise

## 2021-09-21 NOTE — TELEPHONE ENCOUNTER
Yes we discussed possibly requiring FMLA in the near future  He may have a letter stating due to medical condition he is unable  to return to work at this time    He will have follow-up office visit on October 13th as stated

## 2021-09-30 DIAGNOSIS — F41.9 ANXIETY: ICD-10-CM

## 2021-09-30 RX ORDER — CITALOPRAM 20 MG/1
TABLET ORAL
Qty: 15 TABLET | Refills: 0 | Status: SHIPPED | OUTPATIENT
Start: 2021-09-30 | End: 2021-10-04

## 2021-10-04 DIAGNOSIS — F41.9 ANXIETY: ICD-10-CM

## 2021-10-04 RX ORDER — CITALOPRAM 20 MG/1
TABLET ORAL
Qty: 15 TABLET | Refills: 0 | Status: SHIPPED | OUTPATIENT
Start: 2021-10-04 | End: 2021-11-10

## 2021-10-07 ENCOUNTER — RA CDI HCC (OUTPATIENT)
Dept: OTHER | Facility: HOSPITAL | Age: 61
End: 2021-10-07

## 2021-10-14 ENCOUNTER — OFFICE VISIT (OUTPATIENT)
Dept: FAMILY MEDICINE CLINIC | Facility: CLINIC | Age: 61
End: 2021-10-14
Payer: COMMERCIAL

## 2021-10-14 VITALS
RESPIRATION RATE: 18 BRPM | WEIGHT: 223 LBS | TEMPERATURE: 98 F | HEIGHT: 70 IN | BODY MASS INDEX: 31.92 KG/M2 | DIASTOLIC BLOOD PRESSURE: 60 MMHG | SYSTOLIC BLOOD PRESSURE: 100 MMHG | OXYGEN SATURATION: 95 % | HEART RATE: 67 BPM

## 2021-10-14 DIAGNOSIS — R20.2 PARESTHESIA: ICD-10-CM

## 2021-10-14 DIAGNOSIS — F41.9 ANXIETY: Primary | ICD-10-CM

## 2021-10-14 PROCEDURE — 99213 OFFICE O/P EST LOW 20 MIN: CPT | Performed by: FAMILY MEDICINE

## 2021-10-14 PROCEDURE — 1036F TOBACCO NON-USER: CPT | Performed by: FAMILY MEDICINE

## 2021-10-14 PROCEDURE — 3008F BODY MASS INDEX DOCD: CPT | Performed by: FAMILY MEDICINE

## 2021-11-10 DIAGNOSIS — F41.9 ANXIETY: ICD-10-CM

## 2021-11-10 RX ORDER — CITALOPRAM 20 MG/1
TABLET ORAL
Qty: 15 TABLET | Refills: 0 | Status: SHIPPED | OUTPATIENT
Start: 2021-11-10 | End: 2021-12-15

## 2021-11-15 ENCOUNTER — OFFICE VISIT (OUTPATIENT)
Dept: FAMILY MEDICINE CLINIC | Facility: CLINIC | Age: 61
End: 2021-11-15
Payer: COMMERCIAL

## 2021-11-15 VITALS
TEMPERATURE: 97.8 F | OXYGEN SATURATION: 95 % | DIASTOLIC BLOOD PRESSURE: 60 MMHG | HEIGHT: 70 IN | SYSTOLIC BLOOD PRESSURE: 120 MMHG | BODY MASS INDEX: 32.27 KG/M2 | RESPIRATION RATE: 16 BRPM | HEART RATE: 67 BPM | WEIGHT: 225.38 LBS

## 2021-11-15 DIAGNOSIS — F41.9 ANXIETY: Primary | ICD-10-CM

## 2021-11-15 DIAGNOSIS — T14.8XXA MUSCLE STRAIN: ICD-10-CM

## 2021-11-15 PROCEDURE — 99213 OFFICE O/P EST LOW 20 MIN: CPT | Performed by: FAMILY MEDICINE

## 2021-11-15 PROCEDURE — 1036F TOBACCO NON-USER: CPT | Performed by: FAMILY MEDICINE

## 2021-11-15 PROCEDURE — 3008F BODY MASS INDEX DOCD: CPT | Performed by: FAMILY MEDICINE

## 2021-12-15 DIAGNOSIS — F41.9 ANXIETY: ICD-10-CM

## 2021-12-15 RX ORDER — CITALOPRAM 20 MG/1
TABLET ORAL
Qty: 15 TABLET | Refills: 0 | Status: SHIPPED | OUTPATIENT
Start: 2021-12-15 | End: 2021-12-30

## 2021-12-20 ENCOUNTER — OFFICE VISIT (OUTPATIENT)
Dept: FAMILY MEDICINE CLINIC | Facility: CLINIC | Age: 61
End: 2021-12-20
Payer: COMMERCIAL

## 2021-12-20 VITALS
SYSTOLIC BLOOD PRESSURE: 120 MMHG | DIASTOLIC BLOOD PRESSURE: 70 MMHG | BODY MASS INDEX: 32.39 KG/M2 | HEART RATE: 72 BPM | HEIGHT: 70 IN | RESPIRATION RATE: 18 BRPM | WEIGHT: 226.25 LBS | TEMPERATURE: 97.5 F | OXYGEN SATURATION: 97 %

## 2021-12-20 DIAGNOSIS — F41.9 ANXIETY: Primary | ICD-10-CM

## 2021-12-20 DIAGNOSIS — T14.8XXA MUSCLE STRAIN: ICD-10-CM

## 2021-12-20 PROCEDURE — 99213 OFFICE O/P EST LOW 20 MIN: CPT | Performed by: FAMILY MEDICINE

## 2021-12-20 PROCEDURE — 1036F TOBACCO NON-USER: CPT | Performed by: FAMILY MEDICINE

## 2021-12-20 PROCEDURE — 3008F BODY MASS INDEX DOCD: CPT | Performed by: FAMILY MEDICINE

## 2021-12-30 DIAGNOSIS — F41.9 ANXIETY: ICD-10-CM

## 2021-12-30 RX ORDER — CITALOPRAM 20 MG/1
TABLET ORAL
Qty: 45 TABLET | Refills: 1 | Status: SHIPPED | OUTPATIENT
Start: 2021-12-30

## 2022-01-27 ENCOUNTER — NEW PATIENT (OUTPATIENT)
Dept: URBAN - METROPOLITAN AREA CLINIC 6 | Facility: CLINIC | Age: 62
End: 2022-01-27

## 2022-01-27 DIAGNOSIS — H35.371: ICD-10-CM

## 2022-01-27 DIAGNOSIS — H40.023: ICD-10-CM

## 2022-01-27 DIAGNOSIS — Z98.890: ICD-10-CM

## 2022-01-27 DIAGNOSIS — H16.9: ICD-10-CM

## 2022-01-27 PROCEDURE — 92134 CPTRZ OPH DX IMG PST SGM RTA: CPT

## 2022-01-27 PROCEDURE — 92004 COMPRE OPH EXAM NEW PT 1/>: CPT

## 2022-01-27 PROCEDURE — 92025 CPTRIZED CORNEAL TOPOGRAPHY: CPT

## 2022-01-27 ASSESSMENT — TONOMETRY
OS_IOP_MMHG: 18
OD_IOP_MMHG: 24
OD_IOP_MMHG: 22
OS_IOP_MMHG: 17

## 2022-01-27 ASSESSMENT — VISUAL ACUITY
OD_SC: 20/30-2
OS_SC: 20/25-1
OU_SC: J1-1

## 2022-02-11 ENCOUNTER — OFFICE VISIT (OUTPATIENT)
Dept: FAMILY MEDICINE CLINIC | Facility: CLINIC | Age: 62
End: 2022-02-11
Payer: COMMERCIAL

## 2022-02-11 VITALS
DIASTOLIC BLOOD PRESSURE: 60 MMHG | WEIGHT: 225.5 LBS | RESPIRATION RATE: 18 BRPM | OXYGEN SATURATION: 97 % | TEMPERATURE: 97.6 F | HEART RATE: 65 BPM | SYSTOLIC BLOOD PRESSURE: 130 MMHG | BODY MASS INDEX: 32.28 KG/M2 | HEIGHT: 70 IN

## 2022-02-11 DIAGNOSIS — F42.9 OBSESSIVE-COMPULSIVE DISORDER, UNSPECIFIED TYPE: Primary | ICD-10-CM

## 2022-02-11 PROCEDURE — 99213 OFFICE O/P EST LOW 20 MIN: CPT | Performed by: FAMILY MEDICINE

## 2022-02-11 PROCEDURE — 1036F TOBACCO NON-USER: CPT | Performed by: FAMILY MEDICINE

## 2022-02-11 PROCEDURE — 3008F BODY MASS INDEX DOCD: CPT | Performed by: FAMILY MEDICINE

## 2022-02-11 RX ORDER — DIFLUPREDNATE 0.5 MG/ML
EMULSION OPHTHALMIC
COMMUNITY
Start: 2022-01-27

## 2022-02-11 NOTE — ASSESSMENT & PLAN NOTE
Obsessive-compulsive disorder  Patient appears to have developed obsessive-compulsive disorder that may have been triggered by repetitive behavior he was forced to endure during his time in [employer]  He currently takes citalopram 20 mg once daily which helps mildly with anxiety but does not prevent his obsession and compulsions  I agree with his decision to seek behavioral counseling to see if this could help with his condition

## 2022-02-11 NOTE — PROGRESS NOTES
FAMILY PRACTICE OFFICE VISIT       NAME: Julio Akhtar  AGE: 62 y o  SEX: male       : 1960        MRN: [id number]    DATE: 2022  TIME: 5:11 PM    Assessment and Plan     Problem List Items Addressed This Visit        Other    OCD (obsessive compulsive disorder) - Primary     Obsessive-compulsive disorder  Patient appears to have developed obsessive-compulsive disorder that may have been triggered by repetitive behavior he was forced to endure during his time in [employer]  He currently takes citalopram 20 mg once daily which helps mildly with anxiety but does not prevent his obsession and compulsions  I agree with his decision to seek behavioral counseling to see if this could help with his condition  Chief Complaint     Chief Complaint   Patient presents with    Anxiety and ocd     for help with psychiatry        History of Present Illness     Patient the office to discuss chronic medical condition  Patient reports that he served in the [employer] from [id number] as a   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  Sometime after his [employer] service was completed he began experiencing compulsions and obsessions about performing tasks around his home  Patient has felt a compulsion to maintain a neat work environment  He would find himself repetitively checking and rechecking that he had performed his tasks at work in an appropriate manner  At home he would have to get up much earlier the knee needed to before going to work to follow a ritual of checking doors around his home and water meters before he would be able to leave his home  If these tasks are not performed patient experiences significant anxiety  He is currently on the citalopram to treat his anxiety    Up to this point he has been dealing with these issues throughout his life but has been able to perform his job prior to retiring in January 2022  He continues to experience obsessive compulsions in his home  Patient is interested in seeking help to improve this behavior  He states he will be meeting with [facility] to initiate consultation and possible cognitive behavioral therapy to improved this condition  Review of Systems   Review of Systems   Constitutional: Negative  Respiratory: Negative  Cardiovascular: Negative  Gastrointestinal: Negative  Psychiatric/Behavioral:        As per HPI       Active Problem List     Patient Active Problem List   Diagnosis    Anxiety    Psoriasis    Muscle strain    Paresthesia    OCD (obsessive compulsive disorder)       Past Medical History:  History reviewed  No pertinent past medical history  Past Surgical History:  Past Surgical History:   Procedure Laterality Date    KNEE SURGERY      SHOULDER SURGERY         Family History:  Family History   Problem Relation Age of Onset    Stroke Brother     No Known Problems Son     No Known Problems Daughter        Social History:  Social History     Socioeconomic History    Marital status: Unknown     Spouse name: Not on file    Number of children: Not on file    Years of education: Not on file    Highest education level: Not on file   Occupational History    Not on file   Tobacco Use    Smoking status: Never Smoker    Smokeless tobacco: Never Used   Vaping Use    Vaping Use: Never used   Substance and Sexual Activity    Alcohol use:  Yes     Alcohol/week: 1 0 standard drink     Types: 1 Glasses of wine per week     Comment: occasional    Drug use: Never    Sexual activity: Not on file   Other Topics Concern    Not on file   Social History Narrative    Not on file     Social Determinants of Health     Financial Resource Strain: Not on file   Food Insecurity: Not on file   Transportation Needs: Not on file   Physical Activity: Not on file   Stress: Not on file   Social Connections: Not on file   Intimate Partner Violence: Not on file   Housing Stability: Not on file       Objective     Vitals:    02/11/22 1630   BP: 130/60   Pulse: 65   Resp: 18   Temp: 97 6 °F (36 4 °C)   SpO2: 97%     Wt Readings from Last 3 Encounters:   02/11/22 102 kg (225 lb 8 oz)   12/20/21 103 kg (226 lb 4 oz)   11/15/21 102 kg (225 lb 6 oz)       Physical Exam  Constitutional:       General: He is not in acute distress  Appearance: Normal appearance  He is not ill-appearing  Neurological:      General: No focal deficit present  Mental Status: He is alert and oriented to person, place, and time  Mental status is at baseline  Cranial Nerves: No cranial nerve deficit  Psychiatric:         Mood and Affect: Mood normal          Behavior: Behavior normal          Thought Content:  Thought content normal          Judgment: Judgment normal          Pertinent Laboratory/Diagnostic Studies:  Lab Results   Component Value Date    BUN 18 11/05/2019    CREATININE 0 87 11/05/2019    CALCIUM 9 2 11/05/2019    K 3 9 11/05/2019    CO2 27 11/05/2019     11/05/2019     Lab Results   Component Value Date    ALT 36 11/05/2019    AST 21 11/05/2019    ALKPHOS 56 11/05/2019       Lab Results   Component Value Date    WBC 6 15 11/05/2019    HGB 14 8 11/05/2019    HCT 44 3 11/05/2019    MCV 91 11/05/2019     11/05/2019       No results found for: TSH    No results found for: CHOL  Lab Results   Component Value Date    TRIG 77 11/05/2019     Lab Results   Component Value Date    HDL 51 11/05/2019     Lab Results   Component Value Date    LDLCALC 124 (H) 11/05/2019     No results found for: HGBA1C    Results for orders placed or performed in visit on 11/05/19   Lipid panel   Result Value Ref Range    Cholesterol 190 50 - 200 mg/dL    Triglycerides 77 <=150 mg/dL    HDL, Direct 51 >=40 mg/dL    LDL Calculated 124 (H) 0 - 100 mg/dL    Non-HDL-Chol (CHOL-HDL) 139 mg/dl   PSA, Total Screen   Result Value Ref Range PSA 0 4 0 0 - 4 0 ng/mL   TSH, 3rd generation   Result Value Ref Range    TSH 3RD GENERATON 1 060 0 358 - 3 740 uIU/mL   Comprehensive metabolic panel   Result Value Ref Range    Sodium 140 136 - 145 mmol/L    Potassium 3 9 3 5 - 5 3 mmol/L    Chloride 106 100 - 108 mmol/L    CO2 27 21 - 32 mmol/L    ANION GAP 7 4 - 13 mmol/L    BUN 18 5 - 25 mg/dL    Creatinine 0 87 0 60 - 1 30 mg/dL    Glucose, Fasting 103 (H) 65 - 99 mg/dL    Calcium 9 2 8 3 - 10 1 mg/dL    AST 21 5 - 45 U/L    ALT 36 12 - 78 U/L    Alkaline Phosphatase 56 46 - 116 U/L    Total Protein 7 2 6 4 - 8 2 g/dL    Albumin 4 2 3 5 - 5 0 g/dL    Total Bilirubin 0 88 0 20 - 1 00 mg/dL    eGFR 95 ml/min/1 73sq m   CBC   Result Value Ref Range    WBC 6 15 4 31 - 10 16 Thousand/uL    RBC 4 88 3 88 - 5 62 Million/uL    Hemoglobin 14 8 12 0 - 17 0 g/dL    Hematocrit 44 3 36 5 - 49 3 %    MCV 91 82 - 98 fL    MCH 30 3 26 8 - 34 3 pg    MCHC 33 4 31 4 - 37 4 g/dL    RDW 12 4 11 6 - 15 1 %    Platelets [phone] - 289 Thousands/uL    MPV 10 4 8 9 - 12 7 fL       No orders of the defined types were placed in this encounter        ALLERGIES:  No Known Allergies    Current Medications     Current Outpatient Medications   Medication Sig Dispense Refill    Azelaic Acid 15 % cream       citalopram (CeleXA) 20 mg tablet TAKE 1/2 TABLET BY MOUTH EVERY DAY 45 tablet 1    Difluprednate 0 05 % EMUL INSTILL 1 DROP INTO RIGHT EYE THREE TIMES A DAY      halobetasol (ULTRAVATE) 0 05 % cream Apply topically 2 (two) times a day (Patient taking differently: Apply topically as needed  ) 50 g 3    Lotemax 0 5 % OINT INSTILL 1/2 [facility] INTO THE RIGHT EYE (Patient not taking: Reported on 10/14/2021)      minocycline (MINOCIN) 100 mg capsule  (Patient not taking: Reported on 10/14/2021)      predniSONE 20 mg tablet 2 tabs X 3 days, 1 tab X 3 days, 1/2 tab X 4 days (Patient not taking: Reported on 10/14/2021) 11 tablet 0    predniSONE 20 mg tablet Take 2 tablets daily for 3 days, 1 tablet daily for 4 days (Patient not taking: Reported on 9/8/2021) 10 tablet 0    Xiidra 5 % op solution  (Patient not taking: Reported on 12/20/2021 )       No current facility-administered medications for this visit           Health Maintenance     Health Maintenance   Topic Date Due    Hepatitis C Screening  Never done    HIV Screening  Never done    Annual Physical  Never done    DTaP,Tdap,and Td Vaccines (1 - Tdap) Never done    COVID-19 Vaccine (2 - Booster for [employer] series) 05/14/2021    Influenza Vaccine (1) 06/30/2022 (Originally 9/1/2021)    Depression Screening  09/07/2022    BMI: Followup Plan  09/07/2022    Colorectal Cancer Screening  11/09/2022    BMI: Adult  02/11/2023    Pneumococcal Vaccine: Pediatrics (0 to 5 Years) and At-Risk Patients (6 to 59 Years)  Aged Out    HIB Vaccine  Aged Out    Hepatitis B Vaccine  Aged Out    IPV Vaccine  Aged Out    Hepatitis A Vaccine  Aged Out    Meningococcal ACWY Vaccine  Aged Out    HPV Vaccine  Aged [employer] History   Administered Date(s) Administered    COVID-19 J&J ([employer]) vaccine 0 5 mL 64/24/8538       Rito Escobar MD

## 2022-03-03 ENCOUNTER — FOLLOW UP (OUTPATIENT)
Dept: URBAN - METROPOLITAN AREA CLINIC 6 | Facility: CLINIC | Age: 62
End: 2022-03-03

## 2022-03-03 DIAGNOSIS — H35.371: ICD-10-CM

## 2022-03-03 DIAGNOSIS — H40.61X2: ICD-10-CM

## 2022-03-03 DIAGNOSIS — H40.023: ICD-10-CM

## 2022-03-03 DIAGNOSIS — H16.9: ICD-10-CM

## 2022-03-03 PROCEDURE — 92012 INTRM OPH EXAM EST PATIENT: CPT

## 2022-03-03 ASSESSMENT — TONOMETRY
OD_IOP_MMHG: 33
OD_IOP_MMHG: 30
OS_IOP_MMHG: 20
OS_IOP_MMHG: 20

## 2022-03-03 ASSESSMENT — VISUAL ACUITY
OD_CC: 20/40
OD_PH: 20/30
OS_CC: 20/30

## 2022-03-30 ENCOUNTER — SOCIAL WORK (OUTPATIENT)
Dept: BEHAVIORAL/MENTAL HEALTH CLINIC | Facility: CLINIC | Age: 62
End: 2022-03-30
Payer: COMMERCIAL

## 2022-03-30 DIAGNOSIS — F41.9 ANXIETY: Primary | ICD-10-CM

## 2022-03-30 DIAGNOSIS — F42.9 OBSESSIVE-COMPULSIVE DISORDER, UNSPECIFIED TYPE: ICD-10-CM

## 2022-03-30 PROCEDURE — 90834 PSYTX W PT 45 MINUTES: CPT | Performed by: SOCIAL WORKER

## 2022-03-30 NOTE — PSYCH
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   Late 80s got out of [employer]   Working at [employer], pool tech, field maintenance   [employer]  Y-BOCS score 24, indicating moderate-severe anxiety  I do think there is differential diagnosis, anxiety        Review of Systems   Psychiatric/Behavioral: Negative for agitation, behavioral problems, confusion, decreased concentration, dysphoric mood, hallucinations, self-injury, sleep disturbance and suicidal ideas  The patient is nervous/anxious  The patient is not hyperactive  Objective:     Physical Exam  Psychiatric:         Attention and Perception: Attention and perception normal          Mood and Affect: Mood is anxious  Speech: Speech normal          Behavior: Behavior normal  Behavior is cooperative  Thought Content: Thought content normal          Cognition and Memory: Cognition and memory normal          Judgment: Judgment normal       Comments: Patient presents with anxious mood and congruent affect  Patient is well-groomed, with good eye contact and good focus in session  Patient's speech is normal rate and rhythm  Patient is alert, oriented, engaged, and open  Patient's thought process is organized and thought content is future-directed and goal-oriented  Patient's memory and cognition appear intact  Patient denies SI/HI/AH/VH and self-harm

## 2022-04-01 NOTE — PATIENT INSTRUCTIONS
Continue to take all medications as prescribed  Attend all scheduled medical appointments  Follow up with therapist     If you are experiencing a mental health emergency, please call 911 for emergent care, or one of the following numbers for someone to talk to 24 hours a day, 7 days a week:    [facility] ([facility]) AT [place] Intervention: [address] Crisis Intervention: [phone]  CrisisTextLine:  Text PA to [phone]  PA's Support and Referral Hotline: [place]:  [phone]    If you would like to leave a phone message for your therapist, please call the [facility] Integration Department at [phone]  If you need to reach [facility] after hours, please call [phone] for on-call service

## 2022-04-13 ENCOUNTER — SOCIAL WORK (OUTPATIENT)
Dept: BEHAVIORAL/MENTAL HEALTH CLINIC | Facility: CLINIC | Age: 62
End: 2022-04-13
Payer: COMMERCIAL

## 2022-04-13 DIAGNOSIS — F41.9 ANXIETY: ICD-10-CM

## 2022-04-13 DIAGNOSIS — F42.9 OBSESSIVE-COMPULSIVE DISORDER, UNSPECIFIED TYPE: Primary | ICD-10-CM

## 2022-04-13 PROCEDURE — 90834 PSYTX W PT 45 MINUTES: CPT | Performed by: SOCIAL WORKER

## 2022-04-13 NOTE — PSYCH
Psychotherapy Provided: Individual Psychotherapy 40 minutes, from [id number]  Length of time in session: 40 minutes, follow up in as needed in coordination with [address] letter process  Encounter Diagnosis     ICD-10-CM    1  Obsessive-compulsive disorder, unspecified type  F42 9    2  Anxiety  F41 9        Goals addressed in session: Goal 1 Assess for OCD      Current suicide risk : Low     Assessment/Plan:      Diagnoses and all orders for this visit:    Obsessive-compulsive disorder, unspecified type    Anxiety          Subjective:     Patient ID: Gilson Hopkins is a 62 y o  male presenting for follow up appointment with Integration Services  Patient signed consent for me to contact    40 years   Locking doors  Feels like a bucket overflowing  Retired because it was difficult   Apparent OCD, vague in description of symptoms        Review of Systems   Psychiatric/Behavioral: Negative for agitation, behavioral problems, confusion, decreased concentration, dysphoric mood, hallucinations, self-injury, sleep disturbance and suicidal ideas  The patient is nervous/anxious (By report)  The patient is not hyperactive  Objective:     Physical Exam  Psychiatric:         Attention and Perception: Attention and perception normal          Mood and Affect: Mood is anxious (by report)  Speech: Speech normal          Behavior: Behavior normal  Behavior is cooperative  Thought Content: Thought content normal          Cognition and Memory: Cognition and memory normal          Judgment: Judgment normal       Comments: Patient presents with euthymic mood and congruent affect  Patient is well-groomed, with good eye contact and good focus in session  Patient's speech is normal rate and rhythm  Patient is alert, oriented, engaged, and open  Patient's thought process is organized and thought content is future-directed and goal-oriented  Patient's memory and cognition appear intact    Patient denies SI/HI/AH/VH and self-harm

## 2022-04-15 NOTE — PATIENT INSTRUCTIONS
Continue to take all medications as prescribed  Attend all scheduled medical appointments  Follow up with therapist as needed, I will reach out to [place] contact with your signed consent  Referral for further assessment by psychiatry placed  If you are experiencing a mental health emergency, please call 911 for emergent care, or one of the following numbers for someone to talk to 24 hours a day, 7 days a week:    [facility] ([facility]) AT [place] Intervention: [address] Crisis Intervention: [phone]  CrisisTextLine:  Text PA to [phone]  PA's Support and Referral Hotline: [place] [address]:  763.841.9570    If you would like to leave a phone message for your therapist, please call the [address] [place] [address] E Integration Department at [phone]  If you need to reach [facility] [place] [address] [address] after hours, please call [phone] for on-call service

## 2022-05-17 ENCOUNTER — TELEPHONE (OUTPATIENT)
Dept: PSYCHIATRY | Facility: CLINIC | Age: 62
End: 2022-05-17

## 2022-06-02 ENCOUNTER — VBI (OUTPATIENT)
Dept: ADMINISTRATIVE | Facility: OTHER | Age: 62
End: 2022-06-02

## 2022-06-23 ENCOUNTER — TELEPHONE (OUTPATIENT)
Dept: PSYCHIATRY | Facility: CLINIC | Age: 62
End: 2022-06-23

## 2022-06-23 NOTE — TELEPHONE ENCOUNTER
[facility]    Referred by:PCP    Please advised interviewee that they need to answer all questions truthfully to allow for best care and any misrepresentations of information may affect their ability to be seen at this clinic   => Was this discussed? Yes     Behavorial Health Outpatient Intake History -     Presenting Problem (in patient's words):   OCD  Diagnos  Are there any developmental disabilities? ? If yes, can they speak to you on the phone? If they are too limited to speak to you on phone, refer out No    Are you taking any psychiatric medications? Yes    => If yes, who prescribes? If yes, are they injectable medications? Citaprolom  Does the patient have a language barrier or hearing impairment? No    Have you been treated at [facility] by a therapist or a doctor in the past? If yes, who? Yes  Cecilia Sanchez  Has the patient been hospitalized for mental health? No   If yes, how long ago was last hospitalization and where was it? Do you actively use alcohol or marijuana or illegal substances? If yes, what and how much - refer out to Drug and alcohol treatment if use is excessive or daily use of illegal substances No concerns of substance abuse are reported  Do you have a community treatment team or ? No    Legal History-     Does the patient have any history of arrests, detention/[facility] time, or DUIs? No  If Yes-  1) What types of charges? 2) When were they last incarcerated? 3) Are they currently on parole or probation? Minor Child-    Who has custody of the child? Is there a custody agreement? If there is a custody agreement remind parent that they must bring a copy to the first appt or they will not be seen       Intake Team, please check with provider before scheduling if flags come up such as:  - complex case  - legal history (other than DUI)  - communication barrier concerns are present  - if, in your judgment, this needs further review    ACCEPTED as a patient Yes  => Appointment Date: 8/19/2022 with Dr Loo     Referred Elsewhere? No    Name of [address] [place] [address]#[id number]  Insurance Phone #  If ins is primary or secondary:Primary  If patient is a minor, parents information such as Name, D  O B of guarantor
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
## 2022-08-19 ENCOUNTER — OFFICE VISIT (OUTPATIENT)
Dept: PSYCHIATRY | Facility: CLINIC | Age: 62
End: 2022-08-19

## 2022-08-19 ENCOUNTER — TELEPHONE (OUTPATIENT)
Dept: PSYCHIATRY | Facility: CLINIC | Age: 62
End: 2022-08-19

## 2022-08-19 VITALS — BODY MASS INDEX: 31.93 KG/M2 | WEIGHT: 222.5 LBS

## 2022-08-19 DIAGNOSIS — F42.2 MIXED OBSESSIONAL THOUGHTS AND ACTS: Primary | ICD-10-CM

## 2022-08-19 DIAGNOSIS — F41.9 ANXIETY: ICD-10-CM

## 2022-08-19 PROCEDURE — NC001 PR NO CHARGE: Performed by: PSYCHIATRY & NEUROLOGY

## 2022-08-19 RX ORDER — FLUVOXAMINE MALEATE 50 MG/1
TABLET, COATED ORAL
Qty: 28 TABLET | Refills: 0 | Status: SHIPPED | OUTPATIENT
Start: 2022-08-19 | End: 2022-08-26 | Stop reason: ALTCHOICE

## 2022-08-19 NOTE — TELEPHONE ENCOUNTER
pt returned call and requested to hold off on being added to fred list as well as waiting to schedule services for therapy

## 2022-08-19 NOTE — BH TREATMENT PLAN
TREATMENT PLAN        [facility]    Name and Date of Birth:  Clydia Osler 65 y o  1960  Date of Treatment Plan: August 19, 2022  Diagnosis/Diagnoses:    1  Mixed obsessional thoughts and acts    2  Anxiety        Strengths/Personal Resources for Self-Care: supportive family, supportive friends, ability to communicate needs, ability to reason, general fund of knowledge, good physical health, motivation for treatment, ability to negotiate basic needs, work skills  Area/Areas of need (in own words): anxiety, obsessive-compulsive symptoms    1  Long Term Goal: improve control of obsessive thoughts  Target Date:6 months - 2/19/2023  Person/Persons responsible for completion of goal: Dr Kamila Omalley Began    2  Short Term Objective (s) - How will we reach this goal?:   A  Provider new recommended medication/dosage changes and/or continue medication(s): Medication changes: I have discontinued Gianfranco Messer's citalopram  I am also having him start on fluvoxaMINE  Additionally, I am having him maintain his Azelaic Acid, Lotemax, minocycline, predniSONE, halobetasol, predniSONE, Xiidra, and Difluprednate     B  Attend psychotherapy regularly  Uma Corley all scheduled appointments  Target Date:2 months - 10/19/2022  Person/Persons Responsible for Completion of Goal: Dr Ainsley Omalley Goals: starting treatment    Treatment Modality: medication management every 4 weeks  Review due 180 days from date of this plan: 6 months - 2/19/2023  Expected length of service: ongoing treatment  My Physician/PA/NP and I have developed this plan together and I agree to work on the goals and objectives  I understand the treatment goals that were developed for my treatment

## 2022-08-26 ENCOUNTER — OFFICE VISIT (OUTPATIENT)
Dept: FAMILY MEDICINE CLINIC | Facility: CLINIC | Age: 62
End: 2022-08-26
Payer: COMMERCIAL

## 2022-08-26 VITALS
TEMPERATURE: 98 F | DIASTOLIC BLOOD PRESSURE: 60 MMHG | BODY MASS INDEX: 32.09 KG/M2 | SYSTOLIC BLOOD PRESSURE: 118 MMHG | RESPIRATION RATE: 18 BRPM | OXYGEN SATURATION: 96 % | HEART RATE: 85 BPM | HEIGHT: 70 IN | WEIGHT: 224.13 LBS

## 2022-08-26 DIAGNOSIS — M54.16 LUMBAR RADICULOPATHY: Primary | ICD-10-CM

## 2022-08-26 PROCEDURE — 99213 OFFICE O/P EST LOW 20 MIN: CPT | Performed by: FAMILY MEDICINE

## 2022-08-26 PROCEDURE — 3725F SCREEN DEPRESSION PERFORMED: CPT | Performed by: FAMILY MEDICINE

## 2022-08-26 RX ORDER — METHYLPREDNISOLONE 4 MG/1
TABLET ORAL
Qty: 21 EACH | Refills: 0 | Status: SHIPPED | OUTPATIENT
Start: 2022-08-26 | End: 2022-10-04 | Stop reason: ALTCHOICE

## 2022-08-26 RX ORDER — METHOCARBAMOL 500 MG/1
TABLET, FILM COATED ORAL
Qty: 15 TABLET | Refills: 1 | Status: SHIPPED | OUTPATIENT
Start: 2022-08-26

## 2022-08-26 NOTE — ASSESSMENT & PLAN NOTE
Lumbar radiculopathy  Patient given prescription for a Medrol Dosepak take as directed as well as methocarbamol 500 mg b i d   Patient will continue performing stretching exercises provided during his physical therapy several years ago    If symptoms persist or worsen at any point he will obtain x-ray of lumbar spine for evaluation and consider consultation with physical therapy once again

## 2022-08-26 NOTE — PROGRESS NOTES
FAMILY PRACTICE OFFICE VISIT       NAME: Huong Frye  AGE: 65 y o  SEX: male       : 1960        MRN: [id number]    DATE: 2022  TIME: 2:29 PM    Assessment and Plan     Problem List Items Addressed This Visit        Nervous and Auditory    Lumbar radiculopathy - Primary     Lumbar radiculopathy  Patient given prescription for a Medrol Dosepak take as directed as well as methocarbamol 500 mg b i d   Patient will continue performing stretching exercises provided during his physical therapy several years ago  If symptoms persist or worsen at any point he will obtain x-ray of lumbar spine for evaluation and consider consultation with physical therapy once again         Relevant Medications    methylPREDNISolone 4 MG tablet therapy pack    methocarbamol (ROBAXIN) 500 mg tablet    Other Relevant Orders    XR spine lumbar minimum 4 views non injury              Chief Complaint     Chief Complaint   Patient presents with    Nerve Pain       History of Present Illness     Patient in the office with a 2 to three-week history of increasing back and left leg pain  Patient states many years ago he was diagnosed with herniated disc of his lumbar spine  He did see a neurosurgeon Dr Molina who felt surgery was not warranted at that time  Patient underwent physical therapy for his symptoms  Patient retired from his work as a sports  at a local high school in 2022  He has been trying to stay active however his back and leg pain have prevented him from being able to sit or stand for extended periods of time  Review of Systems   Review of Systems   Constitutional: Negative  Respiratory: Negative  Cardiovascular: Negative  Gastrointestinal: Negative  Musculoskeletal: Positive for arthralgias, back pain and gait problem     Neurological:        As per HPI       Active Problem List     Patient Active Problem List   Diagnosis    Anxiety    Psoriasis    Muscle strain  Paresthesia    OCD (obsessive compulsive disorder)    Lumbar radiculopathy       Past Medical History:  History reviewed  No pertinent past medical history  Past Surgical History:  Past Surgical History:   Procedure Laterality Date    KNEE SURGERY      SHOULDER SURGERY         Family History:  Family History   Problem Relation Age of Onset    Stroke Brother     No Known Problems Son     No Known Problems Daughter        Social History:  Social History     Socioeconomic History    Marital status: Unknown     Spouse name: Not on file    Number of children: Not on file    Years of education: Not on file    Highest education level: Not on file   Occupational History    Not on file   Tobacco Use    Smoking status: Never Smoker    Smokeless tobacco: Never Used   Vaping Use    Vaping Use: Never used   Substance and Sexual Activity    Alcohol use: Yes     Alcohol/week: 1 0 standard drink     Types: 1 Glasses of wine per week     Comment: occasional    Drug use: Never    Sexual activity: Yes   Other Topics Concern    Not on file   Social History Narrative    Not on file     Social Determinants of Health     Financial Resource Strain: Not on file   Food Insecurity: Not on file   Transportation Needs: Not on file   Physical Activity: Not on file   Stress: Not on file   Social Connections: Not on file   Intimate Partner Violence: Not on file   Housing Stability: Not on file       Objective     Vitals:    08/26/22 1307   BP: 118/60   Pulse: 85   Resp: 18   Temp: 98 °F (36 7 °C)   SpO2: 96%     Wt Readings from Last 3 Encounters:   08/26/22 102 kg (224 lb 2 oz)   08/19/22 101 kg (222 lb 8 oz)   02/11/22 102 kg (225 lb 8 oz)       Physical Exam  Constitutional:       General: He is not in acute distress  Appearance: Normal appearance  He is not ill-appearing  Musculoskeletal:         General: Tenderness present  Right lower leg: No edema  Left lower leg: No edema        Comments: Patient with minimal tenderness of along lumbar spine to palpation  Range of motion fairly well preserved of lumbar spine  Muscle strength 5/5 lower extremities  Negative straight leg raising  Neurological:      Mental Status: He is alert           Pertinent Laboratory/Diagnostic Studies:  Lab Results   Component Value Date    BUN 18 11/05/2019    CREATININE 0 87 11/05/2019    CALCIUM 9 2 11/05/2019    K 3 9 11/05/2019    CO2 27 11/05/2019     11/05/2019     Lab Results   Component Value Date    ALT 36 11/05/2019    AST 21 11/05/2019    ALKPHOS 56 11/05/2019       Lab Results   Component Value Date    WBC 6 15 11/05/2019    HGB 14 8 11/05/2019    HCT 44 3 11/05/2019    MCV 91 11/05/2019     11/05/2019       No results found for: TSH    No results found for: CHOL  Lab Results   Component Value Date    TRIG 77 11/05/2019     Lab Results   Component Value Date    HDL 51 11/05/2019     Lab Results   Component Value Date    LDLCALC 124 (H) 11/05/2019     No results found for: HGBA1C    Results for orders placed or performed in visit on 11/05/19   Lipid panel   Result Value Ref Range    Cholesterol 190 50 - 200 mg/dL    Triglycerides 77 <=150 mg/dL    HDL, Direct 51 >=40 mg/dL    LDL Calculated 124 (H) 0 - 100 mg/dL    Non-HDL-Chol (CHOL-HDL) 139 mg/dl   PSA, Total Screen   Result Value Ref Range    PSA 0 4 0 0 - 4 0 ng/mL   TSH, 3rd generation   Result Value Ref Range    TSH 3RD GENERATON 1 060 0 358 - 3 740 uIU/mL   Comprehensive metabolic panel   Result Value Ref Range    Sodium 140 136 - 145 mmol/L    Potassium 3 9 3 5 - 5 3 mmol/L    Chloride 106 100 - 108 mmol/L    CO2 27 21 - 32 mmol/L    ANION GAP 7 4 - 13 mmol/L    BUN 18 5 - 25 mg/dL    Creatinine 0 87 0 60 - 1 30 mg/dL    Glucose, Fasting 103 (H) 65 - 99 mg/dL    Calcium 9 2 8 3 - 10 1 mg/dL    AST 21 5 - 45 U/L    ALT 36 12 - 78 U/L    Alkaline Phosphatase 56 46 - 116 U/L    Total Protein 7 2 6 4 - 8 2 g/dL    Albumin 4 2 3 5 - 5 0 g/dL    Total Bilirubin 0 88 0 20 - 1 00 mg/dL    eGFR 95 ml/min/1 73sq m   CBC   Result Value Ref Range    WBC 6 15 4 31 - 10 16 Thousand/uL    RBC 4 88 3 88 - 5 62 Million/uL    Hemoglobin 14 8 12 0 - 17 0 g/dL    Hematocrit 44 3 36 5 - 49 3 %    MCV 91 82 - 98 fL    MCH 30 3 26 8 - 34 3 pg    MCHC 33 4 31 4 - 37 4 g/dL    RDW 12 4 11 6 - 15 1 %    Platelets 415 980 - 333 Thousands/uL    MPV 10 4 8 9 - 12 7 fL       Orders Placed This Encounter   Procedures    XR spine lumbar minimum 4 views non injury       ALLERGIES:  No Known Allergies    Current Medications     Current Outpatient Medications   Medication Sig Dispense Refill    Azelaic Acid 15 % cream       Difluprednate 0 05 % EMUL INSTILL 1 DROP INTO RIGHT EYE THREE TIMES A DAY      methocarbamol (ROBAXIN) 500 mg tablet One po bid 15 tablet 1    methylPREDNISolone 4 MG tablet therapy pack Use as directed on package 21 each 0    halobetasol (ULTRAVATE) 0 05 % cream Apply topically 2 (two) times a day (Patient taking differently: Apply topically as needed  ) 50 g 3     No current facility-administered medications for this visit           Health Maintenance     Health Maintenance   Topic Date Due    Hepatitis C Screening  Never done    HIV Screening  Never done    Annual Physical  Never done    DTaP,Tdap,and Td Vaccines (1 - Tdap) Never done    COVID-19 Vaccine (2 - Booster for Common Curriculum series) 05/14/2021    Influenza Vaccine (1) 09/01/2022    BMI: Followup Plan  09/07/2022    Colorectal Cancer Screening  11/09/2022    Depression Screening  08/26/2023    BMI: Adult  08/26/2023    Pneumococcal Vaccine: Pediatrics (0 to 5 Years) and At-Risk Patients (6 to 59 Years)  Aged Out    HIB Vaccine  Aged Out    Hepatitis B Vaccine  Aged Out    IPV Vaccine  Aged Out    Hepatitis A Vaccine  Aged Out    Meningococcal ACWY Vaccine  Aged Out    HPV Vaccine  Aged [employer] History   Administered Date(s) Administered    COVID-19 J&J (Bomberbot) vaccine 0 5 mL 95/54/0343       Marthe Krabbe, MD

## 2022-09-06 ENCOUNTER — HOSPITAL ENCOUNTER (OUTPATIENT)
Dept: RADIOLOGY | Facility: HOSPITAL | Age: 62
Discharge: HOME | End: 2022-09-06
Payer: COMMERCIAL

## 2022-09-06 DIAGNOSIS — M54.16 LUMBAR RADICULOPATHY: ICD-10-CM

## 2022-09-06 PROCEDURE — 72110 X-RAY EXAM L-2 SPINE 4/>VWS: CPT

## 2022-09-12 ENCOUNTER — OFFICE VISIT (OUTPATIENT)
Dept: PSYCHIATRY | Facility: CLINIC | Age: 62
End: 2022-09-12

## 2022-09-12 VITALS — BODY MASS INDEX: 32.53 KG/M2 | WEIGHT: 226.7 LBS

## 2022-09-12 DIAGNOSIS — F42.2 MIXED OBSESSIONAL THOUGHTS AND ACTS: Primary | ICD-10-CM

## 2022-09-12 DIAGNOSIS — F41.9 ANXIETY: ICD-10-CM

## 2022-09-12 RX ORDER — CITALOPRAM 10 MG/1
TABLET ORAL
Qty: 8 TABLET | Refills: 0 | Status: SHIPPED | OUTPATIENT
Start: 2022-09-12 | End: 2022-10-12 | Stop reason: ALTCHOICE
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
## 2022-09-16 ENCOUNTER — TELEPHONE (OUTPATIENT)
Dept: FAMILY MEDICINE CLINIC | Facility: CLINIC | Age: 62
End: 2022-09-16

## 2022-09-16 DIAGNOSIS — M54.16 LUMBAR RADICULOPATHY: Primary | ICD-10-CM

## 2022-09-16 NOTE — TELEPHONE ENCOUNTER
----- Message from Sherrell Gavin MD sent at 7/85/7292  6:11 AM EDT -----  X-ray of spine did show some mild arthritic changes of his lower back  If his symptoms are persisting I would recommend consultation once again with physical therapy  If he is interested I will place order in [facility] and he may call for appointment    Please provide phone number

## 2022-09-16 NOTE — TELEPHONE ENCOUNTER
Spoke with patient  Made aware of results and provider's instructions  Patient verbalized understanding and was agreeable w/ plan  Orders for PT entered

## 2022-09-21 ENCOUNTER — VBI (OUTPATIENT)
Dept: ADMINISTRATIVE | Facility: OTHER | Age: 62
End: 2022-09-21

## 2022-10-04 ENCOUNTER — OFFICE VISIT (OUTPATIENT)
Dept: FAMILY MEDICINE CLINIC | Facility: CLINIC | Age: 62
End: 2022-10-04
Payer: COMMERCIAL

## 2022-10-04 VITALS
HEIGHT: 70 IN | TEMPERATURE: 98 F | WEIGHT: 226.8 LBS | OXYGEN SATURATION: 96 % | BODY MASS INDEX: 32.47 KG/M2 | SYSTOLIC BLOOD PRESSURE: 114 MMHG | DIASTOLIC BLOOD PRESSURE: 66 MMHG | RESPIRATION RATE: 16 BRPM | HEART RATE: 64 BPM

## 2022-10-04 DIAGNOSIS — M72.2 PLANTAR FASCIITIS: Primary | ICD-10-CM

## 2022-10-04 PROCEDURE — 99213 OFFICE O/P EST LOW 20 MIN: CPT | Performed by: FAMILY MEDICINE

## 2022-10-04 RX ORDER — CYCLOSPORINE 0 G/ML
SOLUTION/ DROPS OPHTHALMIC; TOPICAL
COMMUNITY
Start: 2022-09-28

## 2022-10-04 RX ORDER — MELOXICAM 15 MG/1
15 TABLET ORAL DAILY
Qty: 30 TABLET | Refills: 1 | Status: SHIPPED | OUTPATIENT
Start: 2022-10-04

## 2022-10-04 NOTE — PROGRESS NOTES
FAMILY PRACTICE OFFICE VISIT       NAME: Chano Bolivar  AGE: 65 y o  SEX: male       : 1960        MRN: [id number]    DATE: 10/4/2022  TIME: 4:29 PM    Assessment and Plan     Problem List Items Addressed This Visit        Musculoskeletal and Integument    Plantar fasciitis - Primary     Plantar fasciitis  We discussed treatment options such as massaging his foot or with a frozen water bottle at least 2-3 times daily for 10 minutes  He will also start meloxicam 15 mg 1 p o  q day  If symptoms do not begin to improve within the 1st 10-14 days he will seek consultation with local podiatrist for further evaluation and treatment options         Relevant Medications    meloxicam (Mobic) 15 mg tablet              Chief Complaint     Chief Complaint   Patient presents with    Burning on left heel       History of Present Illness     Patient the office with several weeks history of intermittent left heel discomfort  He describes burning sensation of his left heel which could fluctuate in severity throughout the day  He denies any injuries to this area  Symptoms are similar with all types of foot wear  Review of Systems   Review of Systems   Constitutional: Negative  Musculoskeletal: Positive for gait problem  As per HPI   Neurological:        As per HPI       Active Problem List     Patient Active Problem List   Diagnosis    Anxiety    Psoriasis    Muscle strain    Paresthesia    OCD (obsessive compulsive disorder)    Lumbar radiculopathy    Plantar fasciitis       Past Medical History:  No past medical history on file      Past Surgical History:  Past Surgical History:   Procedure Laterality Date    KNEE SURGERY      SHOULDER SURGERY         Family History:  Family History   Problem Relation Age of Onset    Stroke Brother     No Known Problems Son     No Known Problems Daughter        Social History:  Social History     Socioeconomic History    Marital status: Unknown     Spouse name: Not on file    Number of children: Not on file    Years of education: Not on file    Highest education level: Not on file   Occupational History    Not on file   Tobacco Use    Smoking status: Never Smoker    Smokeless tobacco: Never Used   Vaping Use    Vaping Use: Never used   Substance and Sexual Activity    Alcohol use: Yes     Alcohol/week: 1 0 standard drink     Types: 1 Glasses of wine per week     Comment: occasional    Drug use: Never    Sexual activity: Yes   Other Topics Concern    Not on file   Social History Narrative    Not on file     Social Determinants of Health     Financial Resource Strain: Not on file   Food Insecurity: Not on file   Transportation Needs: Not on file   Physical Activity: Not on file   Stress: Not on file   Social Connections: Not on file   Intimate Partner Violence: Not on file   Housing Stability: Not on file       Objective     Vitals:    10/04/22 1439   BP: 114/66   Pulse: 64   Resp: 16   Temp: 98 °F (36 7 °C)   SpO2: 96%     Wt Readings from Last 3 Encounters:   10/04/22 103 kg (226 lb 12 8 oz)   09/12/22 103 kg (226 lb 11 2 oz)   08/26/22 102 kg (224 lb 2 oz)       Physical Exam  Constitutional:       Appearance: Normal appearance  Musculoskeletal:         General: Tenderness present  No swelling, deformity or signs of injury  Right lower leg: No edema  Left lower leg: No edema  Comments: Patient with tenderness to palpation on the plantar surface of his heel of left foot  There is no swelling or redness noted  Patient is also tender but less so on the lateral side of his heel  Normal range of motion of ankle  Plus two dorsalis pedis and posterior tibialis pulses  No specific tenderness to plantar fascia   Neurological:      General: No focal deficit present  Mental Status: He is alert and oriented to person, place, and time  Mental status is at baseline  Cranial Nerves: No cranial nerve deficit     Psychiatric:         Mood and Affect: Mood normal          Behavior: Behavior normal          Thought Content:  Thought content normal          Judgment: Judgment normal          Pertinent Laboratory/Diagnostic Studies:  Lab Results   Component Value Date    BUN 18 11/05/2019    CREATININE 0 87 11/05/2019    CALCIUM 9 2 11/05/2019    K 3 9 11/05/2019    CO2 27 11/05/2019     11/05/2019     Lab Results   Component Value Date    ALT 36 11/05/2019    AST 21 11/05/2019    ALKPHOS 56 11/05/2019       Lab Results   Component Value Date    WBC 6 15 11/05/2019    HGB 14 8 11/05/2019    HCT 44 3 11/05/2019    MCV 91 11/05/2019     11/05/2019       No results found for: TSH    No results found for: CHOL  Lab Results   Component Value Date    TRIG 77 11/05/2019     Lab Results   Component Value Date    HDL 51 11/05/2019     Lab Results   Component Value Date    LDLCALC 124 (H) 11/05/2019     No results found for: HGBA1C    Results for orders placed or performed in visit on 11/05/19   Lipid panel   Result Value Ref Range    Cholesterol 190 50 - 200 mg/dL    Triglycerides 77 <=150 mg/dL    HDL, Direct 51 >=40 mg/dL    LDL Calculated 124 (H) 0 - 100 mg/dL    Non-HDL-Chol (CHOL-HDL) 139 mg/dl   PSA, Total Screen   Result Value Ref Range    PSA 0 4 0 0 - 4 0 ng/mL   TSH, 3rd generation   Result Value Ref Range    TSH 3RD GENERATON 1 060 0 358 - 3 740 uIU/mL   Comprehensive metabolic panel   Result Value Ref Range    Sodium 140 136 - 145 mmol/L    Potassium 3 9 3 5 - 5 3 mmol/L    Chloride 106 100 - 108 mmol/L    CO2 27 21 - 32 mmol/L    ANION GAP 7 4 - 13 mmol/L    BUN 18 5 - 25 mg/dL    Creatinine 0 87 0 60 - 1 30 mg/dL    Glucose, Fasting 103 (H) 65 - 99 mg/dL    Calcium 9 2 8 3 - 10 1 mg/dL    AST 21 5 - 45 U/L    ALT 36 12 - 78 U/L    Alkaline Phosphatase 56 46 - 116 U/L    Total Protein 7 2 6 4 - 8 2 g/dL    Albumin 4 2 3 5 - 5 0 g/dL    Total Bilirubin 0 88 0 20 - 1 00 mg/dL    eGFR 95 ml/min/1 73sq m   CBC   Result Value Ref Range    WBC 6  15 4 31 - 10 16 Thousand/uL    RBC 4 88 3 88 - 5 62 Million/uL    Hemoglobin 14 8 12 0 - 17 0 g/dL    Hematocrit 44 3 36 5 - 49 3 %    MCV 91 82 - 98 fL    MCH 30 3 26 8 - 34 3 pg    MCHC 33 4 31 4 - 37 4 g/dL    RDW 12 4 11 6 - 15 1 %    Platelets 809 045 - 071 Thousands/uL    MPV 10 4 8 9 - 12 7 fL       No orders of the defined types were placed in this encounter  ALLERGIES:  No Known Allergies    Current Medications     Current Outpatient Medications   Medication Sig Dispense Refill    Azelaic Acid 15 % cream       Cequa 0 09 % SOLN       citalopram (CeleXA) 10 mg tablet Take 1 tablet (10 mg total) by mouth daily for 5 days, THEN 0 5 tablets (5 mg total) daily for 5 days  8 tablet 0    meloxicam (Mobic) 15 mg tablet Take 1 tablet (15 mg total) by mouth daily 30 tablet 1    methocarbamol (ROBAXIN) 500 mg tablet One po bid 15 tablet 1    halobetasol (ULTRAVATE) 0 05 % cream Apply topically 2 (two) times a day (Patient not taking: Reported on 10/4/2022) 50 g 3     No current facility-administered medications for this visit           Health Maintenance     Health Maintenance   Topic Date Due    Hepatitis C Screening  Never done    HIV Screening  Never done    Annual Physical  Never done    DTaP,Tdap,and Td Vaccines (1 - Tdap) Never done    COVID-19 Vaccine (2 - Booster for Elías series) 05/14/2021    BMI: Followup Plan  09/07/2022    Influenza Vaccine (1) 06/30/2023 (Originally 9/1/2022)    Colorectal Cancer Screening  11/09/2022    Depression Screening  10/04/2023    BMI: Adult  10/04/2023    Pneumococcal Vaccine: Pediatrics (0 to 5 Years) and At-Risk Patients (6 to 59 Years)  Aged Out    HIB Vaccine  Aged Out    Hepatitis B Vaccine  Aged Out    IPV Vaccine  Aged Out    Hepatitis A Vaccine  Aged Out    Meningococcal ACWY Vaccine  Aged Out    HPV Vaccine  Aged [employer] History   Administered Date(s) Administered    COVID-19 J&J (Elías) vaccine 0 5 mL 03/19/2021 Cara Reyna

## 2022-10-06 ENCOUNTER — TELEPHONE (OUTPATIENT)
Dept: PSYCHIATRY | Facility: CLINIC | Age: 62
End: 2022-10-06

## 2022-10-06 NOTE — TELEPHONE ENCOUNTER
Patient returned call regarding rescheduling with provider   Writer transferred call to resident line to assist

## 2022-10-12 ENCOUNTER — TELEPHONE (OUTPATIENT)
Dept: PSYCHIATRY | Facility: CLINIC | Age: 62
End: 2022-10-12

## 2022-10-12 ENCOUNTER — OFFICE VISIT (OUTPATIENT)
Dept: PSYCHIATRY | Facility: CLINIC | Age: 62
End: 2022-10-12

## 2022-10-12 VITALS — WEIGHT: 224 LBS | BODY MASS INDEX: 32.14 KG/M2

## 2022-10-12 DIAGNOSIS — F41.9 ANXIETY: ICD-10-CM

## 2022-10-12 DIAGNOSIS — F42.8 OBSESSIONAL THOUGHTS: Primary | ICD-10-CM
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
## 2022-10-24 ENCOUNTER — TELEPHONE (OUTPATIENT)
Dept: FAMILY MEDICINE CLINIC | Facility: CLINIC | Age: 62
End: 2022-10-24

## 2022-10-24 ENCOUNTER — TELEPHONE (OUTPATIENT)
Dept: PSYCHIATRY | Facility: CLINIC | Age: 62
End: 2022-10-24

## 2022-10-24 DIAGNOSIS — F42.9 OBSESSIVE-COMPULSIVE DISORDER, UNSPECIFIED TYPE: Primary | ICD-10-CM

## 2022-10-24 RX ORDER — CITALOPRAM 20 MG/1
20 TABLET ORAL DAILY
Qty: 90 TABLET | Refills: 1 | Status: SHIPPED | OUTPATIENT
Start: 2022-10-24 | End: 2022-12-12

## 2022-10-24 NOTE — TELEPHONE ENCOUNTER
Patient called wanted to know if you could order celexa for him, he is requesting it be sent to [facility] in [place]

## 2022-10-24 NOTE — TELEPHONE ENCOUNTER
Pt phoned in about needing a refill on a medication  Pt then spoke to his family  about the refill  Writer returned the call to see of the refill was needed

## 2022-11-16 ENCOUNTER — TELEPHONE (OUTPATIENT)
Dept: FAMILY MEDICINE CLINIC | Facility: CLINIC | Age: 62
End: 2022-11-16

## 2022-11-17 DIAGNOSIS — Z12.11 COLON CANCER SCREENING: Primary | ICD-10-CM

## 2022-11-23 DIAGNOSIS — M72.2 PLANTAR FASCIITIS: ICD-10-CM

## 2022-11-23 RX ORDER — MELOXICAM 15 MG/1
15 TABLET ORAL DAILY
Qty: 30 TABLET | Refills: 1 | Status: SHIPPED | OUTPATIENT
Start: 2022-11-23

## 2022-12-07 LAB — COLOGUARD RESULT REPORTABLE: NEGATIVE

## 2022-12-08 ENCOUNTER — TELEPHONE (OUTPATIENT)
Dept: FAMILY MEDICINE CLINIC | Facility: CLINIC | Age: 62
End: 2022-12-08

## 2022-12-08 NOTE — TELEPHONE ENCOUNTER
----- Message from Arlene Rose MD sent at 20/5/4128  6:13 AM EST -----  Recent Cologuard test was negative

## 2022-12-12 ENCOUNTER — OFFICE VISIT (OUTPATIENT)
Dept: PSYCHIATRY | Facility: CLINIC | Age: 62
End: 2022-12-12

## 2022-12-12 VITALS — WEIGHT: 223.4 LBS | BODY MASS INDEX: 32.05 KG/M2

## 2022-12-12 DIAGNOSIS — F42.9 OBSESSIVE-COMPULSIVE DISORDER, UNSPECIFIED TYPE: ICD-10-CM

## 2022-12-12 RX ORDER — CITALOPRAM 10 MG/1
10 TABLET ORAL DAILY
Qty: 90 TABLET | Refills: 1 | Status: SHIPPED | OUTPATIENT
Start: 2022-12-12 | End: 2023-03-12
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
## 2023-01-23 DIAGNOSIS — M72.2 PLANTAR FASCIITIS: ICD-10-CM

## 2023-01-23 RX ORDER — MELOXICAM 15 MG/1
15 TABLET ORAL DAILY
Qty: 30 TABLET | Refills: 1 | Status: SHIPPED | OUTPATIENT
Start: 2023-01-23

## 2023-02-03 ENCOUNTER — TELEPHONE (OUTPATIENT)
Dept: PSYCHIATRY | Facility: CLINIC | Age: 63
End: 2023-02-03

## 2023-02-03 NOTE — TELEPHONE ENCOUNTER
Medical record request was received from [place] [employer] for time frame of 11/7/2022- present   Will be placed in resident mailbox to be signed by Dr Lisette Isaac

## 2023-02-15 ENCOUNTER — TELEPHONE (OUTPATIENT)
Dept: PSYCHIATRY | Facility: CLINIC | Age: 63
End: 2023-02-15

## 2023-03-26 DIAGNOSIS — M72.2 PLANTAR FASCIITIS: ICD-10-CM

## 2023-03-26 RX ORDER — MELOXICAM 15 MG/1
15 TABLET ORAL DAILY
Qty: 30 TABLET | Refills: 1 | Status: SHIPPED | OUTPATIENT
Start: 2023-03-26

## 2023-05-28 DIAGNOSIS — M72.2 PLANTAR FASCIITIS: ICD-10-CM

## 2023-05-30 RX ORDER — MELOXICAM 15 MG/1
15 TABLET ORAL DAILY
Qty: 30 TABLET | Refills: 1 | Status: SHIPPED | OUTPATIENT
Start: 2023-05-30

## 2023-07-18 ENCOUNTER — TELEPHONE (OUTPATIENT)
Dept: PSYCHIATRY | Facility: CLINIC | Age: 63
End: 2023-07-18

## 2023-07-26 DIAGNOSIS — M72.2 PLANTAR FASCIITIS: ICD-10-CM

## 2023-07-26 RX ORDER — MELOXICAM 15 MG/1
15 TABLET ORAL DAILY
Qty: 30 TABLET | Refills: 1 | Status: SHIPPED | OUTPATIENT
Start: 2023-07-26

## 2023-08-21 DIAGNOSIS — F42.9 OBSESSIVE-COMPULSIVE DISORDER, UNSPECIFIED TYPE: ICD-10-CM

## 2023-08-22 DIAGNOSIS — F42.9 OBSESSIVE-COMPULSIVE DISORDER, UNSPECIFIED TYPE: ICD-10-CM

## 2023-08-22 RX ORDER — CITALOPRAM HYDROBROMIDE 10 MG/1
10 TABLET ORAL DAILY
Qty: 30 TABLET | Refills: 5 | OUTPATIENT
Start: 2023-08-22

## 2023-08-22 RX ORDER — CITALOPRAM HYDROBROMIDE 10 MG/1
10 TABLET ORAL DAILY
Qty: 90 TABLET | Refills: 1 | OUTPATIENT
Start: 2023-08-22 | End: 2023-11-20

## 2023-08-22 NOTE — TELEPHONE ENCOUNTER
Called patient and he stated that he will try to get his PCP office to fill medication. If he could not he will call back to make appt.

## 2023-08-22 NOTE — TELEPHONE ENCOUNTER
Patient needs to be seen prior to refill, has not been seen since 12/12/22. Patient may discharge and have medication refilled by PCP or may be seen in office for follow up.

## 2023-08-25 DIAGNOSIS — F42.9 OBSESSIVE-COMPULSIVE DISORDER, UNSPECIFIED TYPE: ICD-10-CM

## 2023-08-25 RX ORDER — CITALOPRAM HYDROBROMIDE 10 MG/1
10 TABLET ORAL DAILY
Qty: 30 TABLET | Refills: 5 | OUTPATIENT
Start: 2023-08-25

## 2023-08-28 ENCOUNTER — TELEPHONE (OUTPATIENT)
Dept: PSYCHIATRY | Facility: CLINIC | Age: 63
End: 2023-08-28

## 2023-08-28 NOTE — TELEPHONE ENCOUNTER
I called the patient to schedule an appt so that his medication could be refilled. He is getting his meds through his PCP, therefore you can discharge him.

## 2023-09-12 DIAGNOSIS — F42.9 OBSESSIVE-COMPULSIVE DISORDER, UNSPECIFIED TYPE: ICD-10-CM

## 2023-09-12 RX ORDER — CITALOPRAM HYDROBROMIDE 10 MG/1
10 TABLET ORAL DAILY
Qty: 90 TABLET | Refills: 1 | Status: SHIPPED | OUTPATIENT
Start: 2023-09-12 | End: 2023-12-11

## 2023-09-17 DIAGNOSIS — M72.2 PLANTAR FASCIITIS: ICD-10-CM

## 2023-09-18 RX ORDER — MELOXICAM 15 MG/1
15 TABLET ORAL DAILY
Qty: 30 TABLET | Refills: 3 | Status: SHIPPED | OUTPATIENT
Start: 2023-09-18

## 2023-09-21 NOTE — ASSESSMENT & PLAN NOTE
"[facility] PROGRESS NOTE      Subjective     Martine Jordan is a 33year old here for Weight Problem and Blood Pressure      She had multiple concerns today. Fatigue/ Hypersomnolence-  - feels tired all the time and like she could fall asleep, no matter how much sleep she gets  - she works from home 3 days per week and it's hard to stay awake  - she has been working on sleep hygiene- eliminating TV/screens before bed, bedtime routine, sleep/wake at same time  - gets about 7 hours of sleep on average   - tried not to nap but does nap most every day- usually 2 hours   - she does sleep, no apnea    - no chronic sinus, has chronically large tonsils   - no falling asleep driving   - gets sleepy after meals   - currently taking seroquel, celexa, buspar, and clonidine   - has actually gone down significantly on Seroquel and feels that is not contributing   - no issues with insomnia   - this has been more of an issue for about 1 year     BP-  - went to see old PCP when she was between living in [place] and [place] and when she did the BP was high  - mom has dx of HTN    Social Determinants Never Smoker     Objective   Vitals:    09/21/23 1529   BP: 129/85   Pulse: 74   Resp: 17   Temp: 98.6 Â°F (37 Â°C)   TempSrc: Oral   SpO2: 99%   Weight: 76.7 kg (169 lb)   Height: 5' 5"" (1.651 m)     Physical Exam  Vitals and nursing note reviewed. Constitutional:       General: She is not in acute distress. Appearance: Normal appearance. She is not ill-appearing or toxic-appearing. HENT:      Head: Normocephalic and atraumatic. Mouth/Throat:      Pharynx: Uvula midline. No pharyngeal swelling or posterior oropharyngeal erythema. Tonsils: No tonsillar exudate or tonsillar abscesses. 3+ on the right. 3+ on the left. Neck: No rigidity or tenderness. Eyes:      General: No scleral icterus. Right eye: No discharge. Left eye: No discharge.       Conjunctiva/sclera: Conjunctivae normal.      Pupils: Pupils are equal, " Plantar fasciitis  We discussed treatment options such as massaging his foot or with a frozen water bottle at least 2-3 times daily for 10 minutes  He will also start meloxicam 15 mg 1 p o  q day    If symptoms do not begin to improve within the 1st 10-14 days he will seek consultation with local podiatrist for further evaluation and treatment options round, and reactive to light. Cardiovascular:      Rate and Rhythm: Normal rate and regular rhythm. Heart sounds: No murmur heard. No friction rub. No gallop. Pulmonary:      Effort: Pulmonary effort is normal. No respiratory distress. Breath sounds: Normal breath sounds. No stridor. No wheezing, rhonchi or rales. Lymphadenopathy:      Cervical: No cervical adenopathy. Skin:     General: Skin is warm and dry. Neurological:      General: No focal deficit present. Mental Status: She is alert. Psychiatric:         Mood and Affect: Mood normal.         Behavior: Behavior normal.         Thought Content: Thought content normal.         Judgment: Judgment normal.                ASSESSMENT AND PLAN       1. Daytime hypersomnolence- [place] sleepiness score of 13. Could be multifactorial from possible MELISSA, depression/ anxiety, medication side effects. She does snore and has 3+ tonsils on exam, so I have a high suspicion for MELISSA. - I discussed the seroquel with her behavioral health provider and they recommended that she discontinue it. F/u with behavioral health sooner than December if she stops it and mood worsens.   -     CBC with Automated Differential; Future  -     Thyroid Stimulating Hormone Reflex; Future  -     Comprehensive Metabolic Panel; Future  -     Glycohemoglobin; Future  -     Vitamin D -25 Hydroxy; Future  -     SLEEP STUDY HOME 4 CHANNEL PORTABLE UNATTENDED; Future  2. Subclinical hyperthyroidism  -     Thyroid Stimulating Hormone Reflex; Future  3. Snoring  -     CBC with Automated Differential; Future  -     Thyroid Stimulating Hormone Reflex; Future  -     Comprehensive Metabolic Panel; Future  -     Glycohemoglobin; Future  -     Vitamin D -25 Hydroxy; Future  -     SLEEP STUDY HOME 4 CHANNEL PORTABLE UNATTENDED; Future  4. Large tonsils  -     SLEEP STUDY HOME 4 CHANNEL PORTABLE UNATTENDED; Future  5.  Elevated blood pressure reading without diagnosis of hypertension- prior elevated BP reading. Normal today. - continue to monitor  - encouraged continued efforts at healthy lifestyle changes       Return in about 2 months (around 12/1/2023) for f/u fatigue .

## 2023-10-13 ENCOUNTER — DOCUMENTATION (OUTPATIENT)
Dept: PSYCHIATRY | Facility: CLINIC | Age: 63
End: 2023-10-13
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
## 2024-01-16 ENCOUNTER — OFFICE VISIT (OUTPATIENT)
Dept: FAMILY MEDICINE CLINIC | Facility: CLINIC | Age: 64
End: 2024-01-16
Payer: COMMERCIAL

## 2024-01-16 VITALS
RESPIRATION RATE: 16 BRPM | DIASTOLIC BLOOD PRESSURE: 80 MMHG | SYSTOLIC BLOOD PRESSURE: 148 MMHG | BODY MASS INDEX: 32.64 KG/M2 | OXYGEN SATURATION: 95 % | WEIGHT: 228 LBS | HEART RATE: 74 BPM | TEMPERATURE: 98.4 F | HEIGHT: 70 IN

## 2024-01-16 DIAGNOSIS — R35.1 NOCTURIA: Primary | ICD-10-CM

## 2024-01-16 DIAGNOSIS — M72.2 PLANTAR FASCIITIS: ICD-10-CM

## 2024-01-16 DIAGNOSIS — Z00.00 PERIODIC HEALTH ASSESSMENT, GENERAL SCREENING, ADULT: ICD-10-CM

## 2024-01-16 PROCEDURE — 99396 PREV VISIT EST AGE 40-64: CPT | Performed by: FAMILY MEDICINE

## 2024-01-16 NOTE — ASSESSMENT & PLAN NOTE
Plantar fasciitis.  Patient continues to struggle with intermittent plantar fasciitis.  Patient will continue with ice bottle rolling and meloxicam.  If symptoms persist patient was given the option of seeing physical therapy for EPAT therapy

## 2024-01-16 NOTE — ASSESSMENT & PLAN NOTE
Well adult.  Overall the patient appears to be stable health.  His Cologuard is up-to-date.  He will obtain blood work as ordered for further evaluation.  We will make further recommendations pending results of test.

## 2024-01-16 NOTE — PROGRESS NOTES
FAMILY PRACTICE OFFICE VISIT       NAME: Gianfranco Messer  AGE: 63 y.o. SEX: male       : 1960        MRN: [id number]    DATE: 2024  TIME: 5:33 PM    Assessment and Plan     Problem List Items Addressed This Visit       Plantar fasciitis     Plantar fasciitis.  Patient continues to struggle with intermittent plantar fasciitis.  Patient will continue with ice bottle rolling and meloxicam.  If symptoms persist patient was given the option of seeing physical therapy for EPAT therapy         Periodic health assessment, general screening, adult     Well adult.  Overall the patient appears to be stable health.  His Cologuard is up-to-date.  He will obtain blood work as ordered for further evaluation.  We will make further recommendations pending results of test.         Relevant Orders    CBC    Comprehensive metabolic panel    Lipid panel    TSH, 3rd generation    PSA, Total Screen    Nocturia - Primary    Relevant Orders    PSA, Total Screen           Chief Complaint     Chief Complaint   Patient presents with    Physical Exam     Requesting blood work        History of Present Illness     Patient in the office for annual wellness exam.  He denies any recent illness.  He does go to the gym to exercise 3 days a week.  He is enjoying his residential over the past year.  He does take over-the-counter multivitamin as well as a protein drink after exercising at the gym.  His Cologuard is up-to-date from .    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  For the most part his vision is stable and only uses reading glasses.        Review of Systems   Review of Systems   Constitutional: Negative.    HENT: Negative.     Eyes:  Positive for visual disturbance.   Respiratory: Negative.     Cardiovascular: Negative.    Gastrointestinal: Negative.    Genitourinary: Negative.    Musculoskeletal: Negative.    Skin: Negative.    Neurological: Negative.     Psychiatric/Behavioral: Negative.         Active Problem List     Patient Active Problem List   Diagnosis    Anxiety    Psoriasis    Muscle strain    Paresthesia    Obsessive-compulsive disorder    Lumbar radiculopathy    Plantar fasciitis    Periodic health assessment, general screening, adult    Nocturia       Past Medical History:  History reviewed. No pertinent past medical history.    Past Surgical History:  Past Surgical History:   Procedure Laterality Date    KNEE SURGERY      SHOULDER SURGERY         Family History:  Family History   Problem Relation Age of Onset    Stroke Brother     No Known Problems Son     No Known Problems Daughter        Social History:  Social History     Socioeconomic History    Marital status: Unknown     Spouse name: Not on file    Number of children: Not on file    Years of education: Not on file    Highest education level: Not on file   Occupational History    Not on file   Tobacco Use    Smoking status: Never    Smokeless tobacco: Never   Vaping Use    Vaping status: Never Used   Substance and Sexual Activity    Alcohol use: Yes     Alcohol/week: 1.0 standard drink of alcohol     Types: 1 Glasses of wine per week     Comment: occasional    Drug use: Never    Sexual activity: Yes   Other Topics Concern    Not on file   Social History Narrative    Not on file     Social Determinants of Health     Financial Resource Strain: Not on file   Food Insecurity: Not on file   Transportation Needs: Not on file   Physical Activity: Not on file   Stress: Not on file   Social Connections: Not on file   Intimate Partner Violence: Not on file   Housing Stability: Not on file       Objective     Vitals:    01/16/24 1301   BP: 148/80   Pulse: 74   Resp: 16   Temp: 98.4 °F (36.9 °C)   SpO2: 95%     Wt Readings from Last 3 Encounters:   01/16/24 103 kg (228 lb)   12/12/22 101 kg (223 lb 6.4 oz)   10/12/22 102 kg (224 lb)       Physical Exam  Constitutional:       General: He is not in acute  distress.     Appearance: Normal appearance. He is not ill-appearing.   HENT:      Head: Normocephalic and atraumatic.      Right Ear: Tympanic membrane, ear canal and external ear normal. There is no impacted cerumen.      Left Ear: Tympanic membrane, ear canal and external ear normal. There is no impacted cerumen.   Eyes:      General:         Right eye: No discharge.         Left eye: No discharge.      Extraocular Movements: Extraocular movements intact.      Conjunctiva/sclera: Conjunctivae normal.      Pupils: Pupils are equal, round, and reactive to light.   Neck:      Vascular: No carotid bruit.   Cardiovascular:      Rate and Rhythm: Normal rate and regular rhythm.      Heart sounds: Normal heart sounds. No murmur heard.  Pulmonary:      Effort: Pulmonary effort is normal.      Breath sounds: Normal breath sounds. No wheezing, rhonchi or rales.   Abdominal:      General: Abdomen is flat. Bowel sounds are normal. There is no distension.      Palpations: Abdomen is soft.      Tenderness: There is no abdominal tenderness. There is no guarding or rebound.   Musculoskeletal:      Right lower leg: No edema.      Left lower leg: No edema.   Lymphadenopathy:      Cervical: No cervical adenopathy.   Skin:     Findings: No rash.   Neurological:      General: No focal deficit present.      Mental Status: He is alert and oriented to person, place, and time.      Cranial Nerves: No cranial nerve deficit.   Psychiatric:         Mood and Affect: Mood normal.         Behavior: Behavior normal.         Thought Content: Thought content normal.         Judgment: Judgment normal.         Pertinent Laboratory/Diagnostic Studies:  Lab Results   Component Value Date    BUN 18 11/05/2019    CREATININE 0.87 11/05/2019    CALCIUM 9.2 11/05/2019    K 3.9 11/05/2019    CO2 27 11/05/2019     11/05/2019     Lab Results   Component Value Date    ALT 36 11/05/2019    AST 21 11/05/2019    ALKPHOS 56 11/05/2019       Lab Results  "  Component Value Date    WBC 6.15 11/05/2019    HGB 14.8 11/05/2019    HCT 44.3 11/05/2019    MCV 91 11/05/2019     11/05/2019       No results found for: \"TSH\"    No results found for: \"CHOL\"  Lab Results   Component Value Date    TRIG 77 11/05/2019     Lab Results   Component Value Date    HDL 51 11/05/2019     Lab Results   Component Value Date    LDLCALC 124 (H) 11/05/2019     No results found for: \"HGBA1C\"    Results for orders placed or performed in visit on 11/17/22   Cologuard   Result Value Ref Range    Cologuard Result Negative Negative       Orders Placed This Encounter   Procedures    CBC    Comprehensive metabolic panel    Lipid panel    TSH, 3rd generation    PSA, Total Screen       ALLERGIES:  No Known Allergies    Current Medications     Current Outpatient Medications   Medication Sig Dispense Refill    Azelaic Acid 15 % cream       Cequa 0.09 % SOLN       citalopram (CeleXA) 10 mg tablet Take 1 tablet (10 mg total) by mouth daily 90 tablet 1    meloxicam (MOBIC) 15 mg tablet TAKE 1 TABLET (15 MG TOTAL) BY MOUTH DAILY. 30 tablet 3     No current facility-administered medications for this visit.         Health Maintenance     Health Maintenance   Topic Date Due    Hepatitis C Screening  Never done    HIV Screening  Never done    Annual Physical  Never done    DTaP,Tdap,and Td Vaccines (1 - Tdap) Never done    Zoster Vaccine (1 of 2) Never done    Influenza Vaccine (1) Never done    COVID-19 Vaccine (2 - 2023-24 season) 09/01/2023    Depression Screening  01/16/2025    Colorectal Cancer Screening  12/01/2025    Pneumococcal Vaccine: Pediatrics (0 to 5 Years) and At-Risk Patients (6 to 64 Years)  Aged Out    HIB Vaccine  Aged Out    IPV Vaccine  Aged Out    Hepatitis A Vaccine  Aged Out    Meningococcal ACWY Vaccine  Aged Out    HPV Vaccine  Aged Out     Immunization History   Administered Date(s) Administered    COVID-19 J&J (Elías) vaccine 0.5 mL 03/19/2021       Myles Alicea MD        "

## 2024-01-18 ENCOUNTER — APPOINTMENT (OUTPATIENT)
Dept: LAB | Facility: CLINIC | Age: 64
End: 2024-01-18
Payer: COMMERCIAL

## 2024-01-18 DIAGNOSIS — R35.1 NOCTURIA: ICD-10-CM

## 2024-01-18 DIAGNOSIS — Z00.00 PERIODIC HEALTH ASSESSMENT, GENERAL SCREENING, ADULT: ICD-10-CM

## 2024-01-18 LAB
ALBUMIN SERPL BCP-MCNC: 4.6 G/DL (ref 3.5–5)
ALP SERPL-CCNC: 39 U/L (ref 34–104)
ALT SERPL W P-5'-P-CCNC: 29 U/L (ref 7–52)
ANION GAP SERPL CALCULATED.3IONS-SCNC: 10 MMOL/L
AST SERPL W P-5'-P-CCNC: 32 U/L (ref 13–39)
BILIRUB SERPL-MCNC: 0.55 MG/DL (ref 0.2–1)
BUN SERPL-MCNC: 22 MG/DL (ref 5–25)
CALCIUM SERPL-MCNC: 9.4 MG/DL (ref 8.4–10.2)
CHLORIDE SERPL-SCNC: 105 MMOL/L (ref 96–108)
CHOLEST SERPL-MCNC: 187 MG/DL
CO2 SERPL-SCNC: 27 MMOL/L (ref 21–32)
CREAT SERPL-MCNC: 0.82 MG/DL (ref 0.6–1.3)
ERYTHROCYTE [DISTWIDTH] IN BLOOD BY AUTOMATED COUNT: 12.5 % (ref 11.6–15.1)
GFR SERPL CREATININE-BSD FRML MDRD: 94 ML/MIN/1.73SQ M
GLUCOSE P FAST SERPL-MCNC: 103 MG/DL (ref 65–99)
HCT VFR BLD AUTO: 43.5 % (ref 36.5–49.3)
HDLC SERPL-MCNC: 52 MG/DL
HGB BLD-MCNC: 14.3 G/DL (ref 12–17)
LDLC SERPL CALC-MCNC: 122 MG/DL (ref 0–100)
MCH RBC QN AUTO: 31 PG (ref 26.8–34.3)
MCHC RBC AUTO-ENTMCNC: 32.9 G/DL (ref 31.4–37.4)
MCV RBC AUTO: 94 FL (ref 82–98)
NONHDLC SERPL-MCNC: 135 MG/DL
PLATELET # BLD AUTO: 234 THOUSANDS/UL (ref 149–390)
PMV BLD AUTO: 10.6 FL (ref 8.9–12.7)
POTASSIUM SERPL-SCNC: 4.3 MMOL/L (ref 3.5–5.3)
PROT SERPL-MCNC: 6.2 G/DL (ref 6.4–8.4)
PSA SERPL-MCNC: 0.59 NG/ML (ref 0–4)
RBC # BLD AUTO: 4.62 MILLION/UL (ref 3.88–5.62)
SODIUM SERPL-SCNC: 142 MMOL/L (ref 135–147)
TRIGL SERPL-MCNC: 65 MG/DL
TSH SERPL DL<=0.05 MIU/L-ACNC: 1.35 UIU/ML (ref 0.45–4.5)
WBC # BLD AUTO: 6.23 THOUSAND/UL (ref 4.31–10.16)

## 2024-01-18 PROCEDURE — 85027 COMPLETE CBC AUTOMATED: CPT

## 2024-01-18 PROCEDURE — 80053 COMPREHEN METABOLIC PANEL: CPT

## 2024-01-18 PROCEDURE — 84443 ASSAY THYROID STIM HORMONE: CPT

## 2024-01-18 PROCEDURE — G0103 PSA SCREENING: HCPCS

## 2024-01-18 PROCEDURE — 36415 COLL VENOUS BLD VENIPUNCTURE: CPT

## 2024-01-18 PROCEDURE — 80061 LIPID PANEL: CPT

## 2024-01-19 ENCOUNTER — TELEPHONE (OUTPATIENT)
Dept: FAMILY MEDICINE CLINIC | Facility: CLINIC | Age: 64
End: 2024-01-19

## 2024-01-19 NOTE — TELEPHONE ENCOUNTER
----- Message from Myles Alicea MD sent at 1/19/2024  5:59 AM EST -----  All recent blodd tests stable for pt.

## 2024-01-27 DIAGNOSIS — M72.2 PLANTAR FASCIITIS: ICD-10-CM

## 2024-01-29 RX ORDER — MELOXICAM 15 MG/1
15 TABLET ORAL DAILY
Qty: 30 TABLET | Refills: 3 | Status: SHIPPED | OUTPATIENT
Start: 2024-01-29

## 2024-02-21 PROBLEM — Z00.00 PERIODIC HEALTH ASSESSMENT, GENERAL SCREENING, ADULT: Status: RESOLVED | Noted: 2024-01-16 | Resolved: 2024-02-21

## 2024-04-01 DIAGNOSIS — F42.9 OBSESSIVE-COMPULSIVE DISORDER, UNSPECIFIED TYPE: ICD-10-CM

## 2024-04-01 RX ORDER — CITALOPRAM HYDROBROMIDE 10 MG/1
10 TABLET ORAL DAILY
Qty: 30 TABLET | Refills: 5 | Status: SHIPPED | OUTPATIENT
Start: 2024-04-01

## 2024-05-02 ENCOUNTER — TELEPHONE (OUTPATIENT)
Dept: PSYCHIATRY | Facility: CLINIC | Age: 64
End: 2024-05-02

## 2024-05-02 NOTE — TELEPHONE ENCOUNTER
Patient called and asked if provider could fill out paperwork for him for VA benefits.  Writer told patient that provider discharge patient back in 10/23 and probably not fill out paperwork.  Told patient to ask his PCP doctor and they stated that it is better for a psych doc to fill out.  Patient is willing to start appts again if provider is willing.  Patient stated that the paperwork is for the VA and will give him more benefits if provider states that patient has anxiety and is OCD.  Writer told patient that provider is out of office until next week and will went for his response.

## 2024-09-18 ENCOUNTER — TELEPHONE (OUTPATIENT)
Age: 64
End: 2024-09-18

## 2024-09-18 NOTE — TELEPHONE ENCOUNTER
Called and LVM regarding med mgmt wait list to see if services are still needed and to offer appt. 1st call attempt.

## 2024-09-18 NOTE — TELEPHONE ENCOUNTER
"Behavioral Health Outpatient Intake Questions    Referred By   : Self    Please advise interviewee that they need to answer all questions truthfully to allow for best care, and any misrepresentations of information may affect their ability to be seen at this clinic   => Was this discussed? Yes     Behavioral Health Outpatient Intake History -     Presenting Problem (in patient's own words): OCD    Are there any communication barriers for this patient?     No                                                 Are you taking any psychiatric medications? Yes     If \"YES\" -What are they  Celexa      If \"YES\" -Who prescribes? Used to be Dr. Loo, now PCP    Has the Patient previously received outpatient Talk Therapy or Medication Management from [facility]  Yes        If \"YES\"- When, Where and with Whom? Dr. Loo D/c 10/2023.    Has the Patient abused alcohol or other substances in the last 6 months ? No  No concerns of substance abuse are reported.    Legal History-     Is this treatment court ordered? No     Has the Patient been convicted of a felony?  No    ACCEPTED as a patient Yes  If \"Yes\" Appointment Date: NP appt 12/18 at 9 am with Tamiko SANDOVAL Np packet sent via [employer]  Pt prefers in-person appts    Referred Elsewhere? No    Name of Insurance Co:[employer]  Insurance ID#[id number]   Insurance Phone #  If ins is primary or secondary?Primary  "

## 2024-09-24 ENCOUNTER — TELEPHONE (OUTPATIENT)
Dept: PSYCHIATRY | Facility: CLINIC | Age: 64
End: 2024-09-24

## 2024-09-24 NOTE — TELEPHONE ENCOUNTER
One week follow up call for New Patient appointment with   BUSHRA Schneider   on 12/18/2024 was made on 09/24/2024. Writer informed patient of New Patient paperwork needing to be completed 5 days prior to the appointment. Writer confirmed paperwork has been sent via [employer].    Appointment was made on: 09/18/2024

## 2024-10-02 DIAGNOSIS — F42.9 OBSESSIVE-COMPULSIVE DISORDER, UNSPECIFIED TYPE: ICD-10-CM

## 2024-10-02 RX ORDER — CITALOPRAM HYDROBROMIDE 10 MG/1
10 TABLET ORAL DAILY
Qty: 30 TABLET | Refills: 2 | Status: SHIPPED | OUTPATIENT
Start: 2024-10-02

## 2024-12-03 ENCOUNTER — TELEPHONE (OUTPATIENT)
Age: 64
End: 2024-12-03

## 2024-12-03 ENCOUNTER — TELEPHONE (OUTPATIENT)
Dept: PSYCHIATRY | Facility: CLINIC | Age: 64
End: 2024-12-03

## 2024-12-03 NOTE — TELEPHONE ENCOUNTER
Called and LVM to patient and informed that the  NP appt on 12/18 /24@ 9AM is canceled due to provider is out of the office.     Was asked to call office back to reschedule.    Call center if patient calls back please assist. Patient may reschedule to a different provider.       Thank you.

## 2024-12-03 NOTE — TELEPHONE ENCOUNTER
Patient called in to get his New patient appt that was cancelled rescheduled. Writer was unable to find any openings with Tamiko Canales. Writer was able to reschedule the New patient appt with Norma Kenney for 3/13/2025 at 10 am in office. Patient was fine with being scheduled with a different provider.

## 2025-01-11 DIAGNOSIS — F42.9 OBSESSIVE-COMPULSIVE DISORDER, UNSPECIFIED TYPE: ICD-10-CM

## 2025-01-13 RX ORDER — CITALOPRAM HYDROBROMIDE 10 MG/1
10 TABLET ORAL DAILY
Qty: 30 TABLET | Refills: 0 | Status: SHIPPED | OUTPATIENT
Start: 2025-01-13

## 2025-02-07 DIAGNOSIS — F42.9 OBSESSIVE-COMPULSIVE DISORDER, UNSPECIFIED TYPE: ICD-10-CM

## 2025-02-07 RX ORDER — CITALOPRAM HYDROBROMIDE 10 MG/1
10 TABLET ORAL DAILY
Qty: 30 TABLET | Refills: 3 | Status: SHIPPED | OUTPATIENT
Start: 2025-02-07

## 2025-03-10 ENCOUNTER — TELEPHONE (OUTPATIENT)
Age: 65
End: 2025-03-10

## 2025-03-10 NOTE — TELEPHONE ENCOUNTER
Patient called to confirm upcoming NP appt and address. Writer confirmed 3/13/25 at 10am appt with address.

## 2025-03-13 ENCOUNTER — OFFICE VISIT (OUTPATIENT)
Dept: PSYCHIATRY | Facility: CLINIC | Age: 65
End: 2025-03-13
Payer: COMMERCIAL

## 2025-03-13 VITALS — BODY MASS INDEX: 31.02 KG/M2 | WEIGHT: 216.7 LBS | HEIGHT: 70 IN

## 2025-03-13 DIAGNOSIS — F42.9 OBSESSIVE-COMPULSIVE DISORDER, UNSPECIFIED TYPE: Primary | ICD-10-CM

## 2025-03-13 DIAGNOSIS — F41.9 ANXIETY: ICD-10-CM

## 2025-03-13 PROCEDURE — 90792 PSYCH DIAG EVAL W/MED SRVCS: CPT | Performed by: PHYSICIAN ASSISTANT

## 2025-03-13 RX ORDER — FLUVOXAMINE MALEATE 25 MG
TABLET ORAL
Qty: 30 TABLET | Refills: 1 | Status: SHIPPED | OUTPATIENT
Start: 2025-03-13

## 2025-03-13 RX ORDER — CITALOPRAM HYDROBROMIDE 10 MG/1
TABLET ORAL
Qty: 15 TABLET | Refills: 0 | Status: SHIPPED | OUTPATIENT
Start: 2025-03-13

## 2025-03-13 NOTE — ASSESSMENT & PLAN NOTE
Orders:    fluvoxaMINE (LUVOX) 25 MG tablet; Start in 2 days and take 1/2 tab po qhs x 1 week, then increase to 1 full tab po qhs    citalopram (CeleXA) 10 mg tablet; Take 1 tab po q other day until return to next appt    CBC and differential; Future    Comprehensive metabolic panel; Future    Lipid panel; Future

## 2025-03-13 NOTE — BH TREATMENT PLAN
"TREATMENT PLAN (Medication Management Only)        [facility]    Name/Date of Birth/MRN:  Gianfranco Messer 64 y.o. 1960 MRN: [id number]  Date of Treatment Plan: March 13, 2025  Diagnosis/Diagnoses:   1. Obsessive-compulsive disorder, unspecified type    2. Anxiety      Strengths/Personal Resources for Self-Care: \"Going to the gym; Archery shooting; Fly fishing\"  Area/Areas of need (in own words): \"The OCD probably\"  1. Long Term Goal:   Control of OCD and anxiety Sxs  Target Date: 6 months - 9/13/2025  Person/Persons responsible for completion of goal: Vinny  2.  Short Term Objective (s) - How will we reach this goal?:   A.  Provider new recommended medication/dosage changes and/or continue medication(s):   Pt has a h/o past and current OCD and anxiety.  No h/o mood or psychotic Sxs nor does he demonstrate depressive, manic or psychotic Sxs in office now.  He last saw a psychiatrist in 2022-- Dr Esteban Loo who recommended Fluvoxamine which Pt never took and Pt eventually went back on the Citalopram.   He was most recently being treated for his psychiatric conditions by PCP who has been prescribing Citalopram at 10mg qd, which has been partly effective. Working Dxs are as listed above.  Tx options discussed and Pt is open to a new Tx -- to try the Fluvoxamine at this time, and to slowly reduce the Citalopram.  He declines my recommendation for referral to psychotherapy at this time, but will consider it. Treatment plan done and Pt accepts the plan.  Pt accepts today's plan.   Start in 2 days: Fluvoxamine 25mg (1/2) po qhs x 1 week, then increase to 1 full tab po qhs # 30 R1  Citalopram 10mg (1) tab po q other day # 15  -- Pt also has pills from a previous Rx by PCP so will not need a refill   PCP on 2/7/2025  CMP, CBC with diff, TSH, Lipids  Return 4/17/2025 at 3:00PM. (Pt prefers Tues or Thurs appt days). Can call any time sooner prn.  Pt made aware " of the 24-7 on call service line.    Target Date: 6 months - 9/13/2025  Person/Persons Responsible for Completion of Goal: Vinny   Progress Towards Goals: stable, starting treatment  Treatment Modality:  Medication mgt  Review due 180 days from date of this plan: 6 months - 9/13/2025  Expected length of service: ongoing treatment unless revised  My Physician/PA/NP and I have developed this plan together and I agree to work on the goals and objectives. I understand the treatment goals that were developed for my treatment.  Electronic Signatures: on file (unless signed below)    Norma Kenney PA-C 03/13/25

## 2025-03-20 ENCOUNTER — APPOINTMENT (OUTPATIENT)
Dept: LAB | Facility: CLINIC | Age: 65
End: 2025-03-20
Payer: COMMERCIAL

## 2025-03-20 DIAGNOSIS — F41.9 ANXIETY: ICD-10-CM

## 2025-03-20 DIAGNOSIS — F42.9 OBSESSIVE-COMPULSIVE DISORDER, UNSPECIFIED TYPE: ICD-10-CM

## 2025-03-20 LAB
ALBUMIN SERPL BCG-MCNC: 4.5 G/DL (ref 3.5–5)
ALP SERPL-CCNC: 49 U/L (ref 34–104)
ALT SERPL W P-5'-P-CCNC: 29 U/L (ref 7–52)
ANION GAP SERPL CALCULATED.3IONS-SCNC: 8 MMOL/L (ref 4–13)
AST SERPL W P-5'-P-CCNC: 27 U/L (ref 13–39)
BASOPHILS # BLD AUTO: 0.08 THOUSANDS/ÂΜL (ref 0–0.1)
BASOPHILS NFR BLD AUTO: 1 % (ref 0–1)
BILIRUB SERPL-MCNC: 0.45 MG/DL (ref 0.2–1)
BUN SERPL-MCNC: 22 MG/DL (ref 5–25)
CALCIUM SERPL-MCNC: 9.5 MG/DL (ref 8.4–10.2)
CHLORIDE SERPL-SCNC: 102 MMOL/L (ref 96–108)
CHOLEST SERPL-MCNC: 185 MG/DL (ref ?–200)
CO2 SERPL-SCNC: 31 MMOL/L (ref 21–32)
CREAT SERPL-MCNC: 0.88 MG/DL (ref 0.6–1.3)
EOSINOPHIL # BLD AUTO: 0.39 THOUSAND/ÂΜL (ref 0–0.61)
EOSINOPHIL NFR BLD AUTO: 7 % (ref 0–6)
ERYTHROCYTE [DISTWIDTH] IN BLOOD BY AUTOMATED COUNT: 12.3 % (ref 11.6–15.1)
GFR SERPL CREATININE-BSD FRML MDRD: 91 ML/MIN/1.73SQ M
GLUCOSE P FAST SERPL-MCNC: 105 MG/DL (ref 65–99)
HCT VFR BLD AUTO: 41.8 % (ref 36.5–49.3)
HDLC SERPL-MCNC: 52 MG/DL
HGB BLD-MCNC: 14 G/DL (ref 12–17)
IMM GRANULOCYTES # BLD AUTO: 0.01 THOUSAND/UL (ref 0–0.2)
IMM GRANULOCYTES NFR BLD AUTO: 0 % (ref 0–2)
LDLC SERPL CALC-MCNC: 120 MG/DL (ref 0–100)
LYMPHOCYTES # BLD AUTO: 1.96 THOUSANDS/ÂΜL (ref 0.6–4.47)
LYMPHOCYTES NFR BLD AUTO: 34 % (ref 14–44)
MCH RBC QN AUTO: 31.1 PG (ref 26.8–34.3)
MCHC RBC AUTO-ENTMCNC: 33.5 G/DL (ref 31.4–37.4)
MCV RBC AUTO: 93 FL (ref 82–98)
MONOCYTES # BLD AUTO: 0.65 THOUSAND/ÂΜL (ref 0.17–1.22)
MONOCYTES NFR BLD AUTO: 11 % (ref 4–12)
NEUTROPHILS # BLD AUTO: 2.67 THOUSANDS/ÂΜL (ref 1.85–7.62)
NEUTS SEG NFR BLD AUTO: 47 % (ref 43–75)
NONHDLC SERPL-MCNC: 133 MG/DL
NRBC BLD AUTO-RTO: 0 /100 WBCS
PLATELET # BLD AUTO: 236 THOUSANDS/UL (ref 149–390)
PMV BLD AUTO: 10.4 FL (ref 8.9–12.7)
POTASSIUM SERPL-SCNC: 4.4 MMOL/L (ref 3.5–5.3)
PROT SERPL-MCNC: 6.8 G/DL (ref 6.4–8.4)
RBC # BLD AUTO: 4.5 MILLION/UL (ref 3.88–5.62)
SODIUM SERPL-SCNC: 141 MMOL/L (ref 135–147)
TRIGL SERPL-MCNC: 63 MG/DL (ref ?–150)
TSH SERPL DL<=0.05 MIU/L-ACNC: 1.22 UIU/ML (ref 0.45–4.5)
WBC # BLD AUTO: 5.76 THOUSAND/UL (ref 4.31–10.16)

## 2025-03-20 PROCEDURE — 85025 COMPLETE CBC W/AUTO DIFF WBC: CPT

## 2025-03-20 PROCEDURE — 80061 LIPID PANEL: CPT

## 2025-03-20 PROCEDURE — 84443 ASSAY THYROID STIM HORMONE: CPT

## 2025-03-20 PROCEDURE — 80053 COMPREHEN METABOLIC PANEL: CPT

## 2025-03-20 PROCEDURE — 36415 COLL VENOUS BLD VENIPUNCTURE: CPT

## 2025-04-05 DIAGNOSIS — F42.9 OBSESSIVE-COMPULSIVE DISORDER, UNSPECIFIED TYPE: ICD-10-CM

## 2025-04-07 RX ORDER — FLUVOXAMINE MALEATE 25 MG
TABLET ORAL
Qty: 90 TABLET | Refills: 0 | Status: SHIPPED | OUTPATIENT
Start: 2025-04-07

## 2025-04-15 ENCOUNTER — TELEPHONE (OUTPATIENT)
Dept: PSYCHIATRY | Facility: CLINIC | Age: 65
End: 2025-04-15

## 2025-04-15 NOTE — TELEPHONE ENCOUNTER
Called and connected with an individual that did not disclose their name but said they worked with patient.    This writer left a message with the individual to let the patient know to call back [facility] to r/s appt.    Please assist with r/s upon returned call.

## 2025-05-06 DIAGNOSIS — F42.9 OBSESSIVE-COMPULSIVE DISORDER, UNSPECIFIED TYPE: ICD-10-CM

## 2025-05-06 RX ORDER — CITALOPRAM HYDROBROMIDE 10 MG/1
TABLET ORAL
Qty: 5 TABLET | Refills: 0 | Status: SHIPPED | OUTPATIENT
Start: 2025-05-06
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
## 2025-05-15 ENCOUNTER — OFFICE VISIT (OUTPATIENT)
Dept: PSYCHIATRY | Facility: CLINIC | Age: 65
End: 2025-05-15
Payer: COMMERCIAL

## 2025-05-15 VITALS — BODY MASS INDEX: 31.07 KG/M2 | WEIGHT: 217 LBS | HEIGHT: 70 IN

## 2025-05-15 DIAGNOSIS — F41.9 ANXIETY: ICD-10-CM

## 2025-05-15 DIAGNOSIS — F42.9 OBSESSIVE-COMPULSIVE DISORDER, UNSPECIFIED TYPE: Primary | ICD-10-CM

## 2025-05-15 PROCEDURE — 99214 OFFICE O/P EST MOD 30 MIN: CPT | Performed by: PHYSICIAN ASSISTANT

## 2025-05-15 RX ORDER — FLUVOXAMINE MALEATE 50 MG
50 TABLET ORAL
Qty: 90 TABLET | Refills: 0 | Status: SHIPPED | OUTPATIENT
Start: 2025-05-15

## 2025-05-15 NOTE — ASSESSMENT & PLAN NOTE
Orders:    fluvoxaMINE (LUVOX) 50 mg tablet; Take 1 tablet (50 mg total) by mouth daily at bedtime

## 2025-06-26 DIAGNOSIS — F42.9 OBSESSIVE-COMPULSIVE DISORDER, UNSPECIFIED TYPE: ICD-10-CM

## 2025-06-27 RX ORDER — CITALOPRAM HYDROBROMIDE 10 MG/1
TABLET ORAL
Qty: 45 TABLET | Refills: 1 | OUTPATIENT
Start: 2025-06-27

## 2025-07-08 ENCOUNTER — OFFICE VISIT (OUTPATIENT)
Dept: PSYCHIATRY | Facility: CLINIC | Age: 65
End: 2025-07-08
Payer: COMMERCIAL

## 2025-07-08 VITALS — HEIGHT: 70 IN | WEIGHT: 216 LBS | BODY MASS INDEX: 30.92 KG/M2

## 2025-07-08 DIAGNOSIS — F41.9 ANXIETY: ICD-10-CM

## 2025-07-08 DIAGNOSIS — F42.9 OBSESSIVE-COMPULSIVE DISORDER, UNSPECIFIED TYPE: ICD-10-CM

## 2025-07-08 DIAGNOSIS — F42.2 MIXED OBSESSIONAL THOUGHTS AND ACTS: Primary | ICD-10-CM

## 2025-07-08 DIAGNOSIS — F43.12 CHRONIC POST-TRAUMATIC STRESS DISORDER (PTSD): ICD-10-CM

## 2025-07-08 PROCEDURE — 99214 OFFICE O/P EST MOD 30 MIN: CPT | Performed by: PHYSICIAN ASSISTANT

## 2025-07-08 RX ORDER — FLUVOXAMINE MALEATE 50 MG
50 TABLET ORAL
Qty: 90 TABLET | Refills: 0 | Status: SHIPPED | OUTPATIENT
Start: 2025-07-08

## 2025-07-08 NOTE — PSYCH
MEDICATION MANAGEMENT NOTE    Name: Gianfranco Messer      : 1960      MRN: [id number]  Encounter Provider: Norma Kenney PA-C  Encounter Date: 2025   Encounter department: [facility]    Insurance: Payor: [employer] CROSS / Plan: [facility] PLAN 361 / Product Type: Blue Fee for Service /      Reason for Visit: No chief complaint on file.  :  Assessment & Plan  Mixed obsessional thoughts and acts         Anxiety         Chronic post-traumatic stress disorder (PTSD)         Obsessive-compulsive disorder, unspecified type    Orders:    fluvoxaMINE (LUVOX) 50 mg tablet; Take 1 tablet (50 mg total) by mouth daily at bedtime        Plan:  Pt reports OCD, anxiety, and feels the Fluvoxamine is not helping enough but he does not want to increase the dose due to fear that SE of sedation will get worse.  He now also reports of PTSD Sxs, stating he did not formerly understand the questions.  No panic episodes.  He does report some memory issues.  He did stop the Citalopram at least a week ago without apparent decompensation and does not want any other changes to his regimen.  He refuses any other options/changes to his regimen at this time.  He is seeking increased disability benefits from the [facility] and has a form which requires a doctor or psychologist of the doctorate level to perform.  Pt will f/u with the VA office to get an apt with them if he can soon and has a liaison he had been in contact with (Frank ).  He has had an initial eval by the VA but thinks it did not have all the information which could have helped him have a greater degree of benefits.   Pt will call Frank again to see if simply getting him psychiatric records will allow him to amend the original form.  In the mean time, I will refer to neuropsychological evaluation now-- resources given.  I advised he make an appt for a doctor or Psychologist Virgil here as well.   Pt will sign ROIs for [facility] Dept and Frank specifically, to  "send psychiatric.  records and will also arrange for an appt with a MD or PsyD as required by the [facility] for their benefits form.  I recommended psychotherapy and Pt declines at present.  Treatment to be done next visit.  Pt accepts today's plan.   D/C Citalopram  Continue:   Fluvoxamine 50mg (1) tab po qhs # 90  Return 8-9 weeks.  Can call any time sooner prn.      Treatment Recommendations:    Educated about diagnosis and treatment modalities. Verbalizes understanding and agreement with the treatment plan.  Discussed self monitoring of symptoms, and symptom monitoring tools.  Discussed medications and if treatment adjustment was needed or desired.  Aware of 24 hour and weekend coverage for urgent situations accessed by calling [facility] main practice number  I am scheduling this patient out for greater than 3 months: No    Medications Risks/Benefits:      Risks, Benefits And Possible Side Effects Of Medications:    Risks, benefits, and possible side effects of medications explained to Gianfranco and he (or legal representative) verbalizes understanding and agreement for treatment.    Controlled Medication Discussion:     Not applicable - controlled prescriptions are not prescribed by this practice.      History of Present Illness      Pt presents for medication review with primary c/o  \"I still have flashbacks, I don't sleep, I don't work anymore because I don't want to go back to all this OCD, worrying about this or that.\"   His OCD is still active in intensity with ruminations, over-organizing, excessive cleaning of home, checking door locks, faucets to be sure they're off, counting --ie his money excessively, checks/maintains his apt complex (though this is not his job)-- ie checks water meters.  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  He is still active in " competitive [employer].   His anxiety has been daily and rating from 4-8/10 severity with Sxs of feeling over-worried, stressed and sometimes angry when he is unable to fix something, if a project is not going right for him, insomnia, difficulty relaxing at times, feeling the worst will happen, restlessness, and muscle tightness.  Driving on the highway is anxiety provoking and he only gets on the highway if he absolutely has to, due to fear of accidents happening.  He feels forgetful-- particularly of names of people, takes him longer to do tasks that require more complex number steps or understanding.  He reports that he does have PTSD Sxs of flashbacks, memories, insomnia, nightmares, irritability at times, and some easy startle but no reported avoidance or hypervigilance.   Pt presently denies depression, self-injurious thoughts/behaviors, SI, HI, panic attacks, elevated or irritable moods, over-normal energy, reduced sleep requirement, impulsivity, hallucinations or paranoia.  Pt reports compliance to psychiatric medications without SE.        Pt is applying for increased in [facility] disability benefits and brings a form.     Pt reports compliance to psychiatric medications with SE of Fluvoxamine, with some tiredness but it is tolerable.        Review Of Systems: A review of systems is obtained and is negative except for the pertinent positives listed in HPI/Subjective above.      Current Rating Scores:         Areas of Improvement: No change      Past Medical History[1]  Past Surgical History[2]  Allergies: Allergies[3]    Current Outpatient Medications   Medication Instructions    Azelaic Acid 15 % cream No dose, route, or frequency recorded.    Cequa 0.09 % SOLN No dose, route, or frequency recorded.    fluvoxaMINE (LUVOX) 50 mg, Oral, Daily at bedtime    meloxicam (MOBIC) 15 mg, Oral, Daily        Substance Abuse History:    Tobacco, Alcohol and Drug Use History     Tobacco Use    Smoking status: Never    Smokeless  "tobacco: Never   Vaping Use    Vaping status: Never Used   Substance Use Topics    Alcohol use: Yes     Alcohol/week: 1.0 standard drink of alcohol     Types: 1 Glasses of wine per week     Comment: occasional    Drug use: Never     Alcohol Use: Unknown (8/18/2020)    AUDIT-C     Frequency of Alcohol Consumption: Monthly or less     Average Number of Drinks: 1 or 2       Social History:    Social History     Socioeconomic History    Marital status: /Civil Union     Spouse name: Not on file    Number of children: 1    Years of education: Not on file    Highest education level: Not on file   Occupational History    Not on file   Other Topics Concern    Not on file   Social History Narrative    Not on file        Family Psychiatric History:     Family History[4]    Medical History Reviewed by provider this encounter:  Meds  Med Hx  Fam Hx          Objective   Ht 5' 10\" (1.778 m)   Wt 98 kg (216 lb)   BMI 30.99 kg/m²      Mental Status Evaluation:    Appearance age appropriate, casually dressed, dressed appropriately good eye contact   Behavior pleasant, cooperative, calm   Speech normal rate, normal volume, spontaneous   Mood anxious   Affect normal range and intensity   Thought Processes organized, goal directed, ruminative, obsessive, worrisome, can seem to forget or misinterpret things   Thought Content no overt delusions   Perceptual Disturbances: no auditory hallucinations, no visual hallucinations, does not appear responding to internal stimuli   Abnormal Thoughts  Risk Potential Suicidal ideation - None  Homicidal ideation - None  Potential for aggression - No   Orientation oriented to person, place, situation, day of the week, date, month of the year, and year, city and state  3/3 word recall   Memory short term memory grossly intact   Consciousness alert and awake   Attention Span Concentration Span attention span and concentration are age appropriate -- Pt would not attempt serial 7s, stating he " could not do it.  Serial 3s:  100, 97, 94, 91, 88, 85   Intellect Not formally tested   Insight fair   Judgement good   Muscle Strength and  Gait normal gait and normal balance   Motor activity no abnormal movements   Language no difficulty naming common objects, no difficulty repeating a phrase   Fund of Knowledge adequate knowledge of current events  adequate fund of knowledge regarding past history  adequate fund of knowledge regarding vocabulary   pt able to name the president of the [place]       Laboratory Results: None in EMR since last visit        Suicide/Homicide Risk Assessment:    Risk of Harm to Self:  Weapons/Firearms: guns, bows. The following steps have been taken to ensure weapons are properly secured: locked  Based on today's assessment, Gianfranco presents the following risk of harm to self: minimal    Risk of Harm to Others:  Weapons/Firearms: guns, bows. The following steps have been taken to ensure weapons are properly secured: locked  Based on today's assessment, Gianfranco presents the following risk of harm to others: minimal    The following interventions are recommended: Continue medication management. No other intervention changes indicated at this time.    Psychotherapy Provided:     Individual psychotherapy provided: No    Treatment Plan:    Completed and signed during the session: Not done today-- needed to extensively review Pt's paperwork and request for VA benefits and ensure his understanding    Goals: Progress towards Treatment Plan goals - Stable.    Depression Follow-up Plan Completed: Yes    Note Share:        Administrative Statements       Visit Time  Visit Start Time: 11:45 AM  Visit Stop Time: 12:58 AM  Total Visit Duration: As above minutes        Norma Kenney PA-C 07/08/25         [1]   Past Medical History:  Diagnosis Date    Psoriasis    [2]   Past Surgical History:  Procedure Laterality Date    KNEE SURGERY      SHOULDER SURGERY     [3] No Known Allergies  [4]   Family  History  Problem Relation Name Age of Onset    Stroke Brother      No Known Problems Son

## 2025-07-10 ENCOUNTER — DOCUMENTATION (OUTPATIENT)
Dept: PSYCHIATRY | Facility: CLINIC | Age: 65
End: 2025-07-10

## 2025-07-10 ENCOUNTER — TELEPHONE (OUTPATIENT)
Dept: PSYCHIATRY | Facility: CLINIC | Age: 65
End: 2025-07-10

## 2025-07-10 NOTE — TELEPHONE ENCOUNTER
Release signed and uploaded.    Sending to [facility]O specialist to further process to [facility] for completion.

## 2025-07-10 NOTE — TELEPHONE ENCOUNTER
Received medical record request.  Writer will determine which Clinical supervisor will need to sign on behalf of ZAIN Lee

## 2025-07-10 NOTE — TELEPHONE ENCOUNTER
A medical record request (ADIA) was received 7/10/25 from [facility] ([facility]). The request is for all records.    Paperwork placed in mailboxes of Norma Kenney and Dr Lipscomb (to co-sign for Dr Loo). A copy also emailed to Lilian to forward to co-signer for Jhoana LOCKE

## 2025-07-11 NOTE — PSYCH
Medical records request for all psychiatric/psychotherapeutic records was received from [facility].  ADIA was obtained.  I filled out the form and placed in Deepali's mailbox for further processing by the [facility].

## (undated) RX ORDER — BRIMONIDINE TARTRATE, TIMOLOL MALEATE 2; 5 MG/ML; MG/ML: 1 SOLUTION/ DROPS OPHTHALMIC TWICE A DAY

## (undated) RX ORDER — DUREZOL 0.5 MG/ML: 1 EMULSION OPHTHALMIC